# Patient Record
Sex: MALE | Race: BLACK OR AFRICAN AMERICAN | Employment: OTHER | URBAN - METROPOLITAN AREA
[De-identification: names, ages, dates, MRNs, and addresses within clinical notes are randomized per-mention and may not be internally consistent; named-entity substitution may affect disease eponyms.]

---

## 2020-10-21 ENCOUNTER — HOSPITAL ENCOUNTER (EMERGENCY)
Age: 60
Discharge: LEFT BEFORE DISCHARGE | End: 2020-10-21
Attending: EMERGENCY MEDICINE

## 2020-10-21 VITALS
TEMPERATURE: 97.5 F | RESPIRATION RATE: 17 BRPM | HEIGHT: 68 IN | WEIGHT: 165 LBS | DIASTOLIC BLOOD PRESSURE: 115 MMHG | SYSTOLIC BLOOD PRESSURE: 179 MMHG | OXYGEN SATURATION: 98 % | BODY MASS INDEX: 25.01 KG/M2 | HEART RATE: 83 BPM

## 2020-10-22 NOTE — ED PROVIDER NOTES
Patient eloped from the ED prior to my evaluation. Triage vitals show HTN, otherwise wnl.     MD Kait Chaves MD  10/21/20 0177

## 2020-10-28 ENCOUNTER — APPOINTMENT (OUTPATIENT)
Dept: GENERAL RADIOLOGY | Age: 60
End: 2020-10-28
Payer: MEDICAID

## 2020-10-28 ENCOUNTER — HOSPITAL ENCOUNTER (EMERGENCY)
Age: 60
Discharge: HOME | End: 2020-10-28
Attending: EMERGENCY MEDICINE
Payer: MEDICAID

## 2020-10-28 VITALS
HEIGHT: 69 IN | BODY MASS INDEX: 24.44 KG/M2 | TEMPERATURE: 98.5 F | DIASTOLIC BLOOD PRESSURE: 80 MMHG | SYSTOLIC BLOOD PRESSURE: 176 MMHG | RESPIRATION RATE: 20 BRPM | WEIGHT: 165 LBS | OXYGEN SATURATION: 100 % | HEART RATE: 94 BPM

## 2020-10-28 LAB
ALBUMIN SERPL-MCNC: 3.3 G/DL (ref 3.5–5.2)
ALP BLD-CCNC: 97 U/L (ref 40–129)
ALT SERPL-CCNC: 17 U/L (ref 0–40)
ANION GAP SERPL CALCULATED.3IONS-SCNC: 14 MMOL/L (ref 7–16)
AST SERPL-CCNC: 25 U/L (ref 0–39)
BACTERIA: ABNORMAL /HPF
BETA-HYDROXYBUTYRATE: 0.92 MMOL/L (ref 0.02–0.27)
BILIRUB SERPL-MCNC: 1.1 MG/DL (ref 0–1.2)
BILIRUBIN URINE: NEGATIVE
BLOOD, URINE: ABNORMAL
BUN BLDV-MCNC: 16 MG/DL (ref 6–20)
CALCIUM SERPL-MCNC: 9.3 MG/DL (ref 8.6–10.2)
CHLORIDE BLD-SCNC: 97 MMOL/L (ref 98–107)
CHP ED QC CHECK: NORMAL
CLARITY: ABNORMAL
CO2: 20 MMOL/L (ref 22–29)
COLOR: YELLOW
CREAT SERPL-MCNC: 0.9 MG/DL (ref 0.7–1.2)
GFR AFRICAN AMERICAN: >60
GFR NON-AFRICAN AMERICAN: >60 ML/MIN/1.73
GLUCOSE BLD-MCNC: 346 MG/DL (ref 74–99)
GLUCOSE BLD-MCNC: 388 MG/DL
GLUCOSE BLD-MCNC: 444 MG/DL (ref 74–99)
GLUCOSE BLD-MCNC: 528 MG/DL (ref 74–99)
GLUCOSE URINE: >=1000 MG/DL
HCT VFR BLD CALC: 42.2 % (ref 37–54)
HEMOGLOBIN: 15 G/DL (ref 12.5–16.5)
KETONES, URINE: 15 MG/DL
LEUKOCYTE ESTERASE, URINE: ABNORMAL
MCH RBC QN AUTO: 30.7 PG (ref 26–35)
MCHC RBC AUTO-ENTMCNC: 35.5 % (ref 32–34.5)
MCV RBC AUTO: 86.3 FL (ref 80–99.9)
METER GLUCOSE: 388 MG/DL (ref 74–99)
NITRITE, URINE: NEGATIVE
PDW BLD-RTO: 12.4 FL (ref 11.5–15)
PERFORMED ON: ABNORMAL
PERFORMED ON: ABNORMAL
PH UA: 5.5 (ref 5–9)
PH VENOUS: 7.35 (ref 7.35–7.45)
PLATELET # BLD: 192 E9/L (ref 130–450)
PMV BLD AUTO: 10.9 FL (ref 7–12)
POC CHLORIDE: 101 MMOL/L (ref 100–108)
POC CHLORIDE: 108 MMOL/L (ref 100–108)
POC CREATININE: 0.7 MG/DL (ref 0.7–1.2)
POC CREATININE: 0.8 MG/DL (ref 0.7–1.2)
POC POTASSIUM: 3.3 MMOL/L (ref 3.5–5)
POC POTASSIUM: 4.3 MMOL/L (ref 3.5–5)
POC SODIUM: 133 MMOL/L (ref 132–146)
POC SODIUM: 139 MMOL/L (ref 132–146)
POTASSIUM REFLEX MAGNESIUM: 3.7 MMOL/L (ref 3.5–5)
PROTEIN UA: 100 MG/DL
RBC # BLD: 4.89 E12/L (ref 3.8–5.8)
RBC UA: ABNORMAL /HPF (ref 0–2)
REASON FOR REJECTION: NORMAL
REJECTED TEST: NORMAL
SODIUM BLD-SCNC: 131 MMOL/L (ref 132–146)
SPECIFIC GRAVITY UA: 1.02 (ref 1–1.03)
TOTAL PROTEIN: 7.3 G/DL (ref 6.4–8.3)
TROPONIN: <0.01 NG/ML (ref 0–0.03)
UROBILINOGEN, URINE: 1 E.U./DL
WBC # BLD: 14.7 E9/L (ref 4.5–11.5)
WBC UA: ABNORMAL /HPF (ref 0–5)

## 2020-10-28 PROCEDURE — 82565 ASSAY OF CREATININE: CPT

## 2020-10-28 PROCEDURE — 82800 BLOOD PH: CPT

## 2020-10-28 PROCEDURE — 6360000002 HC RX W HCPCS: Performed by: EMERGENCY MEDICINE

## 2020-10-28 PROCEDURE — 71045 X-RAY EXAM CHEST 1 VIEW: CPT

## 2020-10-28 PROCEDURE — 6370000000 HC RX 637 (ALT 250 FOR IP): Performed by: EMERGENCY MEDICINE

## 2020-10-28 PROCEDURE — 82435 ASSAY OF BLOOD CHLORIDE: CPT

## 2020-10-28 PROCEDURE — 96365 THER/PROPH/DIAG IV INF INIT: CPT

## 2020-10-28 PROCEDURE — 82010 KETONE BODYS QUAN: CPT

## 2020-10-28 PROCEDURE — 96372 THER/PROPH/DIAG INJ SC/IM: CPT

## 2020-10-28 PROCEDURE — 81001 URINALYSIS AUTO W/SCOPE: CPT

## 2020-10-28 PROCEDURE — 93005 ELECTROCARDIOGRAM TRACING: CPT | Performed by: EMERGENCY MEDICINE

## 2020-10-28 PROCEDURE — 2580000003 HC RX 258: Performed by: EMERGENCY MEDICINE

## 2020-10-28 PROCEDURE — 82947 ASSAY GLUCOSE BLOOD QUANT: CPT

## 2020-10-28 PROCEDURE — 80053 COMPREHEN METABOLIC PANEL: CPT

## 2020-10-28 PROCEDURE — 84132 ASSAY OF SERUM POTASSIUM: CPT

## 2020-10-28 PROCEDURE — 84295 ASSAY OF SERUM SODIUM: CPT

## 2020-10-28 PROCEDURE — 82962 GLUCOSE BLOOD TEST: CPT

## 2020-10-28 PROCEDURE — 85027 COMPLETE CBC AUTOMATED: CPT

## 2020-10-28 PROCEDURE — 99284 EMERGENCY DEPT VISIT MOD MDM: CPT

## 2020-10-28 PROCEDURE — 84484 ASSAY OF TROPONIN QUANT: CPT

## 2020-10-28 RX ORDER — 0.9 % SODIUM CHLORIDE 0.9 %
1000 INTRAVENOUS SOLUTION INTRAVENOUS ONCE
Status: COMPLETED | OUTPATIENT
Start: 2020-10-28 | End: 2020-10-28

## 2020-10-28 RX ORDER — LISINOPRIL 10 MG/1
10 TABLET ORAL DAILY
Qty: 30 TABLET | Refills: 0 | Status: SHIPPED | OUTPATIENT
Start: 2020-10-28 | End: 2020-11-27

## 2020-10-28 RX ORDER — CEFDINIR 300 MG/1
300 CAPSULE ORAL 2 TIMES DAILY
Qty: 10 CAPSULE | Refills: 0 | Status: SHIPPED | OUTPATIENT
Start: 2020-10-28 | End: 2020-11-02

## 2020-10-28 RX ORDER — INSULIN HUMAN 100 [IU]/ML
20 INJECTION, SUSPENSION SUBCUTANEOUS 2 TIMES DAILY
Qty: 5 PEN | Refills: 3 | Status: SHIPPED | OUTPATIENT
Start: 2020-10-28

## 2020-10-28 RX ORDER — AMLODIPINE BESYLATE 10 MG/1
10 TABLET ORAL DAILY
Qty: 30 TABLET | Refills: 0 | Status: SHIPPED | OUTPATIENT
Start: 2020-10-28 | End: 2020-11-27

## 2020-10-28 RX ADMIN — INSULIN LISPRO 20 UNITS: 100 INJECTION, SUSPENSION SUBCUTANEOUS at 20:28

## 2020-10-28 RX ADMIN — CEFTRIAXONE SODIUM 1 G: 1 INJECTION, POWDER, FOR SOLUTION INTRAMUSCULAR; INTRAVENOUS at 19:56

## 2020-10-28 RX ADMIN — SODIUM CHLORIDE 1000 ML: 9 INJECTION, SOLUTION INTRAVENOUS at 15:24

## 2020-10-28 ASSESSMENT — ENCOUNTER SYMPTOMS
CHEST TIGHTNESS: 0
ABDOMINAL PAIN: 0
NAUSEA: 0
VOMITING: 0
SHORTNESS OF BREATH: 0

## 2020-10-28 ASSESSMENT — PAIN DESCRIPTION - LOCATION: LOCATION: GROIN

## 2020-10-28 ASSESSMENT — PAIN DESCRIPTION - ORIENTATION: ORIENTATION: RIGHT

## 2020-10-28 ASSESSMENT — PAIN DESCRIPTION - PAIN TYPE: TYPE: ACUTE PAIN

## 2020-10-28 NOTE — ED NOTES
Bed: 18A-18  Expected date:   Expected time:   Means of arrival:   Comments:  [id number] Venancio Cao RN  10/28/20 [id number]

## 2020-10-28 NOTE — ED PROVIDER NOTES
41-year-old male presenting with concern about hyperglycemia. Says he is not had his insulin or his blood pressure medicine or his Flomax for about 2 weeks. [place] here for another week or so. No history of DKA that he is aware of. He does provide the history that he is on insulin, he takes lisinopril, amlodipine, Flomax, and finasteride as well. Awake, alert, oriented x4, no complaint of chest pain or shortness of breath or lightheadedness or dizziness. No nausea or vomiting. Timing: Gradual  Quality: Worsening  Severity: Moderate  Duration: 2 weeks  Associated Signs/symptoms: Not taking his insulin  History reviewed. No pertinent family history. History reviewed. No pertinent surgical history. Review of Systems   Constitutional: Negative for chills and fever. Respiratory: Negative for chest tightness and shortness of breath. Cardiovascular: Negative for chest pain and palpitations. Gastrointestinal: Negative for abdominal pain, nausea and vomiting. Endocrine:        Hyperglycemia   All other systems reviewed and are negative. Physical Exam  Constitutional:       General: He is not in acute distress. Appearance: He is well-developed. HENT:      Head: Normocephalic and atraumatic. Eyes:      Pupils: Pupils are equal, round, and reactive to light. Neck:      Musculoskeletal: Normal range of motion and neck supple. Thyroid: No thyromegaly. Cardiovascular:      Rate and Rhythm: Normal rate and regular rhythm. Pulmonary:      Effort: Pulmonary effort is normal. No respiratory distress. Breath sounds: Normal breath sounds. No wheezing. Abdominal:      General: There is no distension. Palpations: Abdomen is soft. There is no mass. Tenderness: There is no abdominal tenderness. There is no guarding or rebound. Musculoskeletal: Normal range of motion. General: No tenderness.    Skin:     General: Skin is warm and dry. Findings: No erythema. Neurological:      Mental Status: He is alert and oriented to person, place, and time. Cranial Nerves: No cranial nerve deficit. Procedures     MDM     ED Course as of Oct 28 2005   Wed Oct 28, 2020   1928 Patient stanton awake alert and oriented. He will be discharged home with a antibiotic prescription for his urine. He will also be given a prescription for his insulin and his blood pressure medication.    [SO]      ED Course User Index  [SO] Mia Cheema DO        EKG: Sinus rhythm, rate of 85, normal axis, no ST elevations depressions, no T wave abnormalities, nonspecific ST changes. .    ED Course as of Oct 28 2005   Wed Oct 28, 2020   1928 Patient stanton awake alert and oriented. He will be discharged home with a antibiotic prescription for his urine. He will also be given a prescription for his insulin and his blood pressure medication.    [SO]      ED Course User Index  [SO] Mia Cheema DO       --------------------------------------------- PAST HISTORY ---------------------------------------------  Past Medical History:  has a past medical history of Diabetes mellitus ([facility] [address] Past Surgical History:  has no past surgical history on file. Social History:  reports that he has been smoking. He has never used smokeless tobacco.    Family History: family history is not on file. The patients home medications have been reviewed. Allergies: Patient has no known allergies.     -------------------------------------------------- RESULTS -------------------------------------------------  Labs:  Results for orders placed or performed during the hospital encounter of 10/28/20   CBC   Result Value Ref Range    WBC 14.7 (H) 4.5 - 11.5 E9/L    RBC 4.89 3.80 - 5.80 E12/L    Hemoglobin 15.0 12.5 - 16.5 g/dL    Hematocrit 42.2 37.0 - 54.0 %    MCV 86.3 80.0 - 99.9 fL    MCH 30.7 26.0 - 35.0 pg    MCHC 35.5 (H) 32.0 - 34.5 %    RDW 12.4 11.5 - 15.0 fL Platelets 931 410 - 440 E9/L    MPV 10.9 7.0 - 12.0 fL   pH, venous   Result Value Ref Range    pH, Jey 7.35 7.35 - 7.45   Beta-Hydroxybutyrate   Result Value Ref Range    Beta-Hydroxybutyrate 0.92 (H) 0.02 - 0.27 mmol/L   SPECIMEN REJECTION   Result Value Ref Range    Rejected Test cmp trop     Reason for Rejection see below    Comprehensive Metabolic Panel w/ Reflex to MG   Result Value Ref Range    Sodium 131 (L) 132 - 146 mmol/L    Potassium reflex Magnesium 3.7 3.5 - 5.0 mmol/L    Chloride 97 (L) 98 - 107 mmol/L    CO2 20 (L) 22 - 29 mmol/L    Anion Gap 14 7 - 16 mmol/L    Glucose 444 (H) 74 - 99 mg/dL    BUN 16 6 - 20 mg/dL    CREATININE 0.9 0.7 - 1.2 mg/dL    GFR Non-African American >60 >=60 mL/min/1.73    GFR African American >60     Calcium 9.3 8.6 - 10.2 mg/dL    Total Protein 7.3 6.4 - 8.3 g/dL    Alb 3.3 (L) 3.5 - 5.2 g/dL    Total Bilirubin 1.1 0.0 - 1.2 mg/dL    Alkaline Phosphatase 97 40 - 129 U/L    ALT 17 0 - 40 U/L    AST 25 0 - 39 U/L   Troponin   Result Value Ref Range    Troponin <0.01 0.00 - 0.03 ng/mL   Urinalysis with Microscopic   Result Value Ref Range    Color, UA Yellow Straw/Yellow    Clarity, UA SL CLOUDY Clear    Glucose, Ur >=1000 (A) Negative mg/dL    Bilirubin Urine Negative Negative    Ketones, Urine 15 (A) Negative mg/dL    Specific Gravity, UA 1.020 1.005 - 1.030    Blood, Urine SMALL (A) Negative    pH, UA 5.5 5.0 - 9.0    Protein,  (A) Negative mg/dL    Urobilinogen, Urine 1.0 <2.0 E.U./dL    Nitrite, Urine Negative Negative    Leukocyte Esterase, Urine TRACE (A) Negative    WBC, UA 10-20 (A) 0 - 5 /HPF    RBC, UA 5-10 (A) 0 - 2 /HPF    Bacteria, UA MANY (A) None Seen /HPF   POCT Venous   Result Value Ref Range    POC Sodium 133 132 - 146 mmol/L    POC Potassium 4.3 3.5 - 5.0 mmol/L    POC Chloride 101 100 - 108 mmol/L    POC Glucose 528 (HH) 74 - 99 mg/dl    POC Creatinine 0.8 0.7 - 1.2 mg/dL    GFR Non-African American >60 >=60 mL/min/1.73    GFR African American >60 Performed on SEE BELOW    POCT glucose   Result Value Ref Range    Glucose 388 mg/dL    QC OK? ok    POCT Glucose   Result Value Ref Range    Meter Glucose 388 (H) 74 - 99 mg/dL   POCT Venous   Result Value Ref Range    POC Sodium 139 132 - 146 mmol/L    POC Potassium 3.3 (L) 3.5 - 5.0 mmol/L    POC Chloride 108 100 - 108 mmol/L    POC Glucose 346 (H) 74 - 99 mg/dl    POC Creatinine 0.7 0.7 - 1.2 mg/dL    GFR Non-African American >60 >=60 mL/min/1.73    GFR  >60     Performed on SEE BELOW        Radiology:  XR CHEST PORTABLE   Final Result   No acute cardiopulmonary abnormality.             ------------------------- NURSING NOTES AND VITALS REVIEWED ---------------------------  Date / Time Roomed:  10/28/2020  2:44 PM  ED Bed Assignment:  18A/18A-18    The nursing notes within the ED encounter and vital signs as below have been reviewed. BP (!) 172/81   Pulse 90   Temp 98.6 °F (37 °C) (Infrared)   Resp 20   Ht 5' 8.5\" (1.74 m)   Wt 165 lb (74.8 kg)   SpO2 100%   BMI 24.72 kg/m²   Oxygen Saturation Interpretation: Normal      ------------------------------------------ PROGRESS NOTES ------------------------------------------  I have spoken with the patient and discussed todays results, in addition to providing specific details for the plan of care and counseling regarding the diagnosis and prognosis. Their questions are answered at this time and they are agreeable with the plan. I discussed at length with them reasons for immediate return here for re evaluation. They will followup with primary care by calling their office tomorrow. --------------------------------- ADDITIONAL PROVIDER NOTES ---------------------------------  At this time the patient is without objective evidence of an acute process requiring hospitalization or inpatient management. They have remained hemodynamically stable throughout their entire ED visit and are stable for discharge with outpatient follow-up. The plan has been discussed in detail and they are aware of the specific conditions for emergent return, as well as the importance of follow-up. New Prescriptions    AMLODIPINE (NORVASC) 10 MG TABLET    Take 1 tablet by mouth daily    CEFDINIR (OMNICEF) 300 MG CAPSULE    Take 1 capsule by mouth 2 times daily for 5 days    INSULIN NPH ISOPHANE & REGULAR (HUMULIN 70/30 KWIKPEN) (70-30) 100 UNIT PER ML INJECTION PEN    Inject 20 Units into the skin 2 times daily    LISINOPRIL (PRINIVIL;ZESTRIL) 10 MG TABLET    Take 1 tablet by mouth daily       Diagnosis:  1. Hyperglycemia    2. Acute cystitis without hematuria        Disposition:  Patient's disposition: Discharge to home  Patient's condition is stable.            Jose Bowen DO  10/28/20 2005

## 2020-10-31 LAB
EKG ATRIAL RATE: 85 BPM
EKG P AXIS: 81 DEGREES
EKG P-R INTERVAL: 122 MS
EKG Q-T INTERVAL: 398 MS
EKG QRS DURATION: 80 MS
EKG QTC CALCULATION (BAZETT): 473 MS
EKG R AXIS: 69 DEGREES
EKG T AXIS: 51 DEGREES
EKG VENTRICULAR RATE: 85 BPM

## 2020-10-31 PROCEDURE — 93010 ELECTROCARDIOGRAM REPORT: CPT | Performed by: INTERNAL MEDICINE

## 2020-12-21 ENCOUNTER — APPOINTMENT (OUTPATIENT)
Dept: ULTRASOUND IMAGING | Age: 60
DRG: 501 | End: 2020-12-21
Payer: MEDICAID

## 2020-12-21 ENCOUNTER — HOSPITAL ENCOUNTER (INPATIENT)
Age: 60
LOS: 1 days | Discharge: HOME | DRG: 501 | End: 2020-12-23
Attending: EMERGENCY MEDICINE | Admitting: FAMILY MEDICINE
Payer: MEDICAID

## 2020-12-21 PROBLEM — N45.4 EPIDIDYMAL ABSCESS: Status: ACTIVE | Noted: 2020-12-21

## 2020-12-21 LAB
ALBUMIN SERPL-MCNC: 3.5 G/DL (ref 3.5–5.2)
ALP BLD-CCNC: 71 U/L (ref 40–129)
ALT SERPL-CCNC: 13 U/L (ref 0–40)
ANION GAP SERPL CALCULATED.3IONS-SCNC: 12 MMOL/L (ref 7–16)
AST SERPL-CCNC: 26 U/L (ref 0–39)
BACTERIA: ABNORMAL /HPF
BASOPHILS ABSOLUTE: 0.02 E9/L (ref 0–0.2)
BASOPHILS RELATIVE PERCENT: 0.2 % (ref 0–2)
BILIRUB SERPL-MCNC: 0.8 MG/DL (ref 0–1.2)
BILIRUBIN URINE: NEGATIVE
BLOOD, URINE: ABNORMAL
BUN BLDV-MCNC: 11 MG/DL (ref 6–20)
CALCIUM SERPL-MCNC: 9.4 MG/DL (ref 8.6–10.2)
CHLORIDE BLD-SCNC: 103 MMOL/L (ref 98–107)
CLARITY: ABNORMAL
CO2: 23 MMOL/L (ref 22–29)
COLOR: YELLOW
CREAT SERPL-MCNC: 0.9 MG/DL (ref 0.7–1.2)
EOSINOPHILS ABSOLUTE: 0.01 E9/L (ref 0.05–0.5)
EOSINOPHILS RELATIVE PERCENT: 0.1 % (ref 0–6)
EPITHELIAL CELLS, UA: ABNORMAL /HPF
GFR AFRICAN AMERICAN: >60
GFR NON-AFRICAN AMERICAN: >60 ML/MIN/1.73
GLUCOSE BLD-MCNC: 130 MG/DL (ref 74–99)
GLUCOSE URINE: NEGATIVE MG/DL
HCT VFR BLD CALC: 40.1 % (ref 37–54)
HEMOGLOBIN: 13.7 G/DL (ref 12.5–16.5)
IMMATURE GRANULOCYTES #: 0.03 E9/L
IMMATURE GRANULOCYTES %: 0.3 % (ref 0–5)
KETONES, URINE: ABNORMAL MG/DL
LEUKOCYTE ESTERASE, URINE: ABNORMAL
LYMPHOCYTES ABSOLUTE: 2.06 E9/L (ref 1.5–4)
LYMPHOCYTES RELATIVE PERCENT: 22.3 % (ref 20–42)
MCH RBC QN AUTO: 30.6 PG (ref 26–35)
MCHC RBC AUTO-ENTMCNC: 34.2 % (ref 32–34.5)
MCV RBC AUTO: 89.7 FL (ref 80–99.9)
METER GLUCOSE: 215 MG/DL (ref 74–99)
METER GLUCOSE: 218 MG/DL (ref 74–99)
MONOCYTES ABSOLUTE: 0.68 E9/L (ref 0.1–0.95)
MONOCYTES RELATIVE PERCENT: 7.4 % (ref 2–12)
NEUTROPHILS ABSOLUTE: 6.45 E9/L (ref 1.8–7.3)
NEUTROPHILS RELATIVE PERCENT: 69.7 % (ref 43–80)
NITRITE, URINE: POSITIVE
PDW BLD-RTO: 13.3 FL (ref 11.5–15)
PH UA: 6 (ref 5–9)
PLATELET # BLD: 265 E9/L (ref 130–450)
PMV BLD AUTO: 10.2 FL (ref 7–12)
POTASSIUM REFLEX MAGNESIUM: 4.3 MMOL/L (ref 3.5–5)
PROTEIN UA: 100 MG/DL
RBC # BLD: 4.47 E12/L (ref 3.8–5.8)
RBC UA: ABNORMAL /HPF (ref 0–2)
SODIUM BLD-SCNC: 138 MMOL/L (ref 132–146)
SPECIFIC GRAVITY UA: >=1.03 (ref 1–1.03)
TOTAL PROTEIN: 7.5 G/DL (ref 6.4–8.3)
UROBILINOGEN, URINE: 2 E.U./DL
WBC # BLD: 9.3 E9/L (ref 4.5–11.5)
WBC UA: ABNORMAL /HPF (ref 0–5)

## 2020-12-21 PROCEDURE — 6370000000 HC RX 637 (ALT 250 FOR IP): Performed by: FAMILY MEDICINE

## 2020-12-21 PROCEDURE — 87040 BLOOD CULTURE FOR BACTERIA: CPT

## 2020-12-21 PROCEDURE — 6360000002 HC RX W HCPCS: Performed by: STUDENT IN AN ORGANIZED HEALTH CARE EDUCATION/TRAINING PROGRAM

## 2020-12-21 PROCEDURE — 96376 TX/PRO/DX INJ SAME DRUG ADON: CPT

## 2020-12-21 PROCEDURE — G0378 HOSPITAL OBSERVATION PER HR: HCPCS

## 2020-12-21 PROCEDURE — 85025 COMPLETE CBC W/AUTO DIFF WBC: CPT

## 2020-12-21 PROCEDURE — 87088 URINE BACTERIA CULTURE: CPT

## 2020-12-21 PROCEDURE — 99283 EMERGENCY DEPT VISIT LOW MDM: CPT

## 2020-12-21 PROCEDURE — 81001 URINALYSIS AUTO W/SCOPE: CPT

## 2020-12-21 PROCEDURE — 76870 US EXAM SCROTUM: CPT

## 2020-12-21 PROCEDURE — 96375 TX/PRO/DX INJ NEW DRUG ADDON: CPT

## 2020-12-21 PROCEDURE — 2580000003 HC RX 258: Performed by: FAMILY MEDICINE

## 2020-12-21 PROCEDURE — 2580000003 HC RX 258: Performed by: STUDENT IN AN ORGANIZED HEALTH CARE EDUCATION/TRAINING PROGRAM

## 2020-12-21 PROCEDURE — 87591 N.GONORRHOEAE DNA AMP PROB: CPT

## 2020-12-21 PROCEDURE — 93975 VASCULAR STUDY: CPT

## 2020-12-21 PROCEDURE — 82962 GLUCOSE BLOOD TEST: CPT

## 2020-12-21 PROCEDURE — 96374 THER/PROPH/DIAG INJ IV PUSH: CPT

## 2020-12-21 PROCEDURE — 87186 SC STD MICRODIL/AGAR DIL: CPT

## 2020-12-21 PROCEDURE — 80053 COMPREHEN METABOLIC PANEL: CPT

## 2020-12-21 PROCEDURE — 96365 THER/PROPH/DIAG IV INF INIT: CPT

## 2020-12-21 PROCEDURE — 96372 THER/PROPH/DIAG INJ SC/IM: CPT

## 2020-12-21 PROCEDURE — 6360000002 HC RX W HCPCS: Performed by: FAMILY MEDICINE

## 2020-12-21 PROCEDURE — 87491 CHLMYD TRACH DNA AMP PROBE: CPT

## 2020-12-21 RX ORDER — DEXTROSE MONOHYDRATE 50 MG/ML
100 INJECTION, SOLUTION INTRAVENOUS PRN
Status: DISCONTINUED | OUTPATIENT
Start: 2020-12-21 | End: 2020-12-23 | Stop reason: HOSPADM

## 2020-12-21 RX ORDER — KETOROLAC TROMETHAMINE 30 MG/ML
15 INJECTION, SOLUTION INTRAMUSCULAR; INTRAVENOUS EVERY 6 HOURS PRN
Status: DISCONTINUED | OUTPATIENT
Start: 2020-12-21 | End: 2020-12-23 | Stop reason: HOSPADM

## 2020-12-21 RX ORDER — MAGNESIUM SULFATE IN WATER 40 MG/ML
2 INJECTION, SOLUTION INTRAVENOUS PRN
Status: DISCONTINUED | OUTPATIENT
Start: 2020-12-21 | End: 2020-12-23 | Stop reason: HOSPADM

## 2020-12-21 RX ORDER — PROMETHAZINE HYDROCHLORIDE 25 MG/1
12.5 TABLET ORAL EVERY 6 HOURS PRN
Status: DISCONTINUED | OUTPATIENT
Start: 2020-12-21 | End: 2020-12-23 | Stop reason: HOSPADM

## 2020-12-21 RX ORDER — SODIUM CHLORIDE 0.9 % (FLUSH) 0.9 %
10 SYRINGE (ML) INJECTION PRN
Status: DISCONTINUED | OUTPATIENT
Start: 2020-12-21 | End: 2020-12-23 | Stop reason: HOSPADM

## 2020-12-21 RX ORDER — POLYETHYLENE GLYCOL 3350 17 G/17G
17 POWDER, FOR SOLUTION ORAL DAILY PRN
Status: DISCONTINUED | OUTPATIENT
Start: 2020-12-21 | End: 2020-12-23 | Stop reason: HOSPADM

## 2020-12-21 RX ORDER — TAMSULOSIN HYDROCHLORIDE 0.4 MG/1
0.4 CAPSULE ORAL DAILY
Status: DISCONTINUED | OUTPATIENT
Start: 2020-12-22 | End: 2020-12-23 | Stop reason: HOSPADM

## 2020-12-21 RX ORDER — NICOTINE POLACRILEX 4 MG
15 LOZENGE BUCCAL PRN
Status: DISCONTINUED | OUTPATIENT
Start: 2020-12-21 | End: 2020-12-23 | Stop reason: HOSPADM

## 2020-12-21 RX ORDER — ACETAMINOPHEN 325 MG/1
650 TABLET ORAL EVERY 6 HOURS PRN
Status: DISCONTINUED | OUTPATIENT
Start: 2020-12-21 | End: 2020-12-23 | Stop reason: HOSPADM

## 2020-12-21 RX ORDER — ONDANSETRON 2 MG/ML
4 INJECTION INTRAMUSCULAR; INTRAVENOUS EVERY 6 HOURS PRN
Status: DISCONTINUED | OUTPATIENT
Start: 2020-12-21 | End: 2020-12-23 | Stop reason: HOSPADM

## 2020-12-21 RX ORDER — HYDROCODONE BITARTRATE AND ACETAMINOPHEN 5; 325 MG/1; MG/1
1 TABLET ORAL EVERY 4 HOURS PRN
Status: DISCONTINUED | OUTPATIENT
Start: 2020-12-21 | End: 2020-12-23 | Stop reason: HOSPADM

## 2020-12-21 RX ORDER — INSULIN GLARGINE 100 [IU]/ML
0.25 INJECTION, SOLUTION SUBCUTANEOUS NIGHTLY
Status: DISCONTINUED | OUTPATIENT
Start: 2020-12-21 | End: 2020-12-23 | Stop reason: HOSPADM

## 2020-12-21 RX ORDER — DEXTROSE MONOHYDRATE 25 G/50ML
12.5 INJECTION, SOLUTION INTRAVENOUS PRN
Status: DISCONTINUED | OUTPATIENT
Start: 2020-12-21 | End: 2020-12-23 | Stop reason: HOSPADM

## 2020-12-21 RX ORDER — LISINOPRIL 10 MG/1
10 TABLET ORAL DAILY
Status: DISCONTINUED | OUTPATIENT
Start: 2020-12-21 | End: 2020-12-23 | Stop reason: HOSPADM

## 2020-12-21 RX ORDER — SODIUM CHLORIDE 0.9 % (FLUSH) 0.9 %
10 SYRINGE (ML) INJECTION EVERY 12 HOURS SCHEDULED
Status: DISCONTINUED | OUTPATIENT
Start: 2020-12-21 | End: 2020-12-23 | Stop reason: HOSPADM

## 2020-12-21 RX ORDER — KETOROLAC TROMETHAMINE 30 MG/ML
30 INJECTION, SOLUTION INTRAMUSCULAR; INTRAVENOUS ONCE
Status: COMPLETED | OUTPATIENT
Start: 2020-12-21 | End: 2020-12-21

## 2020-12-21 RX ORDER — ACETAMINOPHEN 650 MG/1
650 SUPPOSITORY RECTAL EVERY 6 HOURS PRN
Status: DISCONTINUED | OUTPATIENT
Start: 2020-12-21 | End: 2020-12-23 | Stop reason: HOSPADM

## 2020-12-21 RX ORDER — AMLODIPINE BESYLATE 10 MG/1
10 TABLET ORAL DAILY
Status: DISCONTINUED | OUTPATIENT
Start: 2020-12-21 | End: 2020-12-23 | Stop reason: HOSPADM

## 2020-12-21 RX ORDER — POTASSIUM CHLORIDE 7.45 MG/ML
10 INJECTION INTRAVENOUS PRN
Status: DISCONTINUED | OUTPATIENT
Start: 2020-12-21 | End: 2020-12-23 | Stop reason: HOSPADM

## 2020-12-21 RX ORDER — POTASSIUM CHLORIDE 20 MEQ/1
40 TABLET, EXTENDED RELEASE ORAL PRN
Status: DISCONTINUED | OUTPATIENT
Start: 2020-12-21 | End: 2020-12-23 | Stop reason: HOSPADM

## 2020-12-21 RX ADMIN — HYDROCODONE BITARTRATE AND ACETAMINOPHEN 1 TABLET: 5; 325 TABLET ORAL at 23:58

## 2020-12-21 RX ADMIN — AMLODIPINE BESYLATE 10 MG: 10 TABLET ORAL at 17:16

## 2020-12-21 RX ADMIN — INSULIN LISPRO 2 UNITS: 100 INJECTION, SOLUTION INTRAVENOUS; SUBCUTANEOUS at 20:48

## 2020-12-21 RX ADMIN — INSULIN GLARGINE 19 UNITS: 100 INJECTION, SOLUTION SUBCUTANEOUS at 20:49

## 2020-12-21 RX ADMIN — ENOXAPARIN SODIUM 40 MG: 40 INJECTION SUBCUTANEOUS at 16:40

## 2020-12-21 RX ADMIN — INSULIN LISPRO 4 UNITS: 100 INJECTION, SOLUTION INTRAVENOUS; SUBCUTANEOUS at 16:40

## 2020-12-21 RX ADMIN — KETOROLAC TROMETHAMINE 15 MG: 30 INJECTION, SOLUTION INTRAMUSCULAR at 18:29

## 2020-12-21 RX ADMIN — SODIUM CHLORIDE, PRESERVATIVE FREE 10 ML: 5 INJECTION INTRAVENOUS at 20:48

## 2020-12-21 RX ADMIN — KETOROLAC TROMETHAMINE 30 MG: 30 INJECTION, SOLUTION INTRAMUSCULAR at 12:06

## 2020-12-21 RX ADMIN — CEFTRIAXONE SODIUM 1 G: 1 INJECTION, POWDER, FOR SOLUTION INTRAMUSCULAR; INTRAVENOUS at 14:03

## 2020-12-21 RX ADMIN — LISINOPRIL 10 MG: 10 TABLET ORAL at 17:16

## 2020-12-21 ASSESSMENT — PAIN DESCRIPTION - PROGRESSION: CLINICAL_PROGRESSION: NOT CHANGED

## 2020-12-21 ASSESSMENT — PAIN DESCRIPTION - FREQUENCY: FREQUENCY: CONTINUOUS

## 2020-12-21 ASSESSMENT — ENCOUNTER SYMPTOMS
WHEEZING: 0
NAUSEA: 0
BACK PAIN: 0
VOMITING: 0
SORE THROAT: 0
ABDOMINAL PAIN: 0
EYE DISCHARGE: 0
DIARRHEA: 0
SINUS PRESSURE: 0
EYE PAIN: 0
SHORTNESS OF BREATH: 0
COUGH: 0
EYE REDNESS: 0

## 2020-12-21 ASSESSMENT — PAIN DESCRIPTION - ORIENTATION
ORIENTATION: RIGHT;LEFT
ORIENTATION: RIGHT;LEFT

## 2020-12-21 ASSESSMENT — PAIN DESCRIPTION - LOCATION
LOCATION: SCROTUM
LOCATION: SCROTUM

## 2020-12-21 ASSESSMENT — PAIN - FUNCTIONAL ASSESSMENT: PAIN_FUNCTIONAL_ASSESSMENT: PREVENTS OR INTERFERES SOME ACTIVE ACTIVITIES AND ADLS

## 2020-12-21 ASSESSMENT — PAIN DESCRIPTION - PAIN TYPE
TYPE: ACUTE PAIN
TYPE: ACUTE PAIN

## 2020-12-21 ASSESSMENT — PAIN SCALES - GENERAL
PAINLEVEL_OUTOF10: 6
PAINLEVEL_OUTOF10: 8
PAINLEVEL_OUTOF10: 10
PAINLEVEL_OUTOF10: 10
PAINLEVEL_OUTOF10: 6

## 2020-12-21 ASSESSMENT — PAIN DESCRIPTION - DESCRIPTORS
DESCRIPTORS: CONSTANT;ACHING;DISCOMFORT
DESCRIPTORS: CONSTANT

## 2020-12-21 ASSESSMENT — PAIN DESCRIPTION - ONSET: ONSET: ON-GOING

## 2020-12-21 NOTE — H&P
Hospitalist History & Physical      PCP: No primary care provider on file. Date of Admission: 12/21/2020    Date of Service: Pt seen/examined on 12/21/2020    Chief Complaint:  had concerns including Groin Swelling (Started prior to Friday, went to [place] today and sent in for testiclar swelling and pain. States on Friday he went and bough a support strap with no relief. Swelling is only on the left with pain generalized. ). History Of Present Illness:    Mr. Kareem Galvez, a 62y.o. year old male  who  has a past medical history of Diabetes mellitus ([facility] [address] Patient presented to the emergency department with complaint of left-sided testicular swelling. This began about 1 week ago. Pain is persistent and relieved with elevation. Denies fever, chills, nausea, vomiting, chest pain, shortness of breath, abdominal pain, diarrhea. Vital signs assessed emergency department within normal limits and stable. Laboratory studies unremarkable. Urinalysis shows positive for infection. Ultrasound of the scrotum reveals possible epididymal abscess. Urology was consulted. An empiric antibiotics were initiated      Past Medical History:        Diagnosis Date    Diabetes mellitus ([facility] [address]        Past Surgical History:    History reviewed. No pertinent surgical history. Medications Prior to Admission:      Prior to Admission medications    Medication Sig Start Date End Date Taking? Authorizing Provider   Insulin NPH Isophane & Regular (HUMULIN 70/30 KWIKPEN) (70-30) 100 UNIT per ML injection pen Inject 20 Units into the skin 2 times daily 10/28/20   Andrew Arriaga, DO   amLODIPine (NORVASC) 10 MG tablet Take 1 tablet by mouth daily 10/28/20 11/27/20  Andrew Arriaga, DO   lisinopril (PRINIVIL;ZESTRIL) 10 MG tablet Take 1 tablet by mouth daily 10/28/20 11/27/20  Andrew Arriaga, DO       Allergies:  Patient has no known allergies. Social History:    TOBACCO:   reports that he has been smoking.  He has never used smokeless tobacco.  ETOH:   has no history on file for alcohol. Family History:    Reviewed in detail and negative for DM, CAD, Cancer, CVA. Positive as follows\"  History reviewed. No pertinent family history. REVIEW OF SYSTEMS:   Pertinent positives as noted in the HPI. All other systems reviewed and negative. PHYSICAL EXAM:  BP (!) 168/82   Pulse 71   Temp 98.1 °F (36.7 °C) (Oral)   Resp 16   Ht 5' 8\" (1.727 m)   Wt 165 lb (74.8 kg)   SpO2 98%   BMI 25.09 kg/m²   General appearance: No apparent distress, appears stated age and cooperative. HEENT: Normal cephalic, atraumatic without obvious deformity. Pupils equal, round, and reactive to light. Extra ocular muscles intact. Conjunctivae/corneas clear. Neck: Supple, with full range of motion. No jugular venous distention. Trachea midline. Respiratory: Clear to auscultation bilaterally  Cardiovascular: Regular rate and rhythm  Abdomen: Soft, nontender, nondistended  Musculoskeletal: No clubbing, cyanosis, edema of bilateral lower extremities. Brisk capillary refill. Skin: Normal skin color. No rashes or lesions. Neurologic:  Neurovascularly intact without any focal sensory/motor deficits. Cranial nerves: II-XII intact, grossly non-focal.      CBC:   Recent Labs     12/21/20  1137   WBC 9.3   RBC 4.47   HGB 13.7   HCT 40.1   MCV 89.7   RDW 13.3        BMP:   Recent Labs     12/21/20  1137      K 4.3      CO2 23   BUN 11   CREATININE 0.9     LFT:  Recent Labs     12/21/20  1137   PROT 7.5   ALKPHOS 71   ALT 13   AST 26   BILITOT 0.8     CE:  No results for input(s): Carlos Alberto Beery in the last 72 hours. PT/INR: No results for input(s): INR, APTT in the last 72 hours. BNP: No results for input(s): BNP in the last 72 hours.   ESR: No results found for: SEDRATE  CRP: No results found for: CRP  D Dimer: No results found for: DDIMER   Folate and B12: No results found for: BCMGBDVI30, No results found for: FOLATE  Lactic Acid: No shows diffuse increased echogenicity with increased thickening of the central septations. Overall shape is slightly abnormal. There is blood flow seen only on 1 posteroinferior margin in the rest of the left testicle does not show any discernible blood flow, this could be a portion that is several days old. In addition there is diffuse swelling of the left scrotum and swelling of the left epididymis and surrounding tissues with several loculated abnormal fluid collections around the margins outside of the testicle. Considerations include hematomas versus abscesses. Doppler Evaluation:  Left testicle is abnormal with severely diminished abnormal blood flow, possible chronic torsion. Scrotal Sac:  No simple hydrocele. Diffusely thickened scrotal walls. Severe posterior scrotal swelling. Scattered loculated abnormal fluid pockets. The largest of these is septated, measures approximately 2 cm x 1.2 cm x 1.2 cm with echogenic fluid in the center. Increased blood flow around the outside margins of this collection. Abscess versus hematoma most likely within the epididymis. Epididymis:  Very abnormal left epididymis. No normal left epididymal structures seen. Reference scrotal contents above. Very abnormal study with severe swelling on the left have mild swelling on the right. Abnormal left testicle, which could be a torsion of several days old. Very abnormal left epididymis, thickened, inflamed, with scattered complex fluid collections. Abscesses versus hematomas. Right testicle slightly hyperemic but without evidence of torsion. Right epididymis is slightly thickened and moderately hyperemic. Report called to [facility] emergency room and discussed directly with Dr. Kirsty Billingsley at 1340 hours 12/21/2020.     Us Dup Abd Pel Retro Scrot Complete    Result Date: 12/21/2020  EXAMINATION: ULTRASOUND OF THE SCROTUM/TESTICLES WITH COLOR DOPPLER FLOW EVALUATION; DOPPLER EVALUATION OF THE PELVIS 12/21/2020 COMPARISON: None. HISTORY: ORDERING SYSTEM PROVIDED HISTORY: swelling TECHNOLOGIST PROVIDED HISTORY: Reason for exam:->swelling What reading provider will be dictating this exam?->CRC FINDINGS: Measurements: Right testicle: The right testicle measures approximately 4.6 cm long by 2.0 cm AP by  2.9 cm transverse. Left testicle: The left testicle measures approximately 3.8 cm long by 2.0 cm AP by 3.0 cm transverse. Right: Grey scale: The right testicle demonstrates normal homogeneous echotexture without focal lesion. No evidence of testicular microlithiasis. Doppler Evaluation:  There is normal arterial and venous Doppler flow within the testicle. Overall increased blood flow on the right side. Scrotal Sac:  No evidence of hydrocele. Epididymis: The right epididymis is diffusely mildly thickened in the head, body and tail with diffuse increased blood flow. Dilated veins are also seen in the spermatic cord. The epididymal body measures approximately 9 mm thick, normal is maximum 3 mm. The right epididymal head was measured at 1.0 cm by 1.4 cm x 0.7 cm with a heterogeneous appearance. Adjacent to this is a large structure measuring 2 cm diameter with some gas shadowing, possible hernia. Left: Grey scale: The left testicle is grossly abnormal in appearance and echotexture. It shows diffuse increased echogenicity with increased thickening of the central septations. Overall shape is slightly abnormal. There is blood flow seen only on 1 posteroinferior margin in the rest of the left testicle does not show any discernible blood flow, this could be a portion that is several days old. In addition there is diffuse swelling of the left scrotum and swelling of the left epididymis and surrounding tissues with several loculated abnormal fluid collections around the margins outside of the testicle. Considerations include hematomas versus abscesses.  Doppler Evaluation:  Left testicle is abnormal with severely diminished abnormal blood flow, possible chronic torsion. Scrotal Sac:  No simple hydrocele. Diffusely thickened scrotal walls. Severe posterior scrotal swelling. Scattered loculated abnormal fluid pockets. The largest of these is septated, measures approximately 2 cm x 1.2 cm x 1.2 cm with echogenic fluid in the center. Increased blood flow around the outside margins of this collection. Abscess versus hematoma most likely within the epididymis. Epididymis:  Very abnormal left epididymis. No normal left epididymal structures seen. Reference scrotal contents above. Very abnormal study with severe swelling on the left have mild swelling on the right. Abnormal left testicle, which could be a torsion of several days old. Very abnormal left epididymis, thickened, inflamed, with scattered complex fluid collections. Abscesses versus hematomas. Right testicle slightly hyperemic but without evidence of torsion. Right epididymis is slightly thickened and moderately hyperemic. Report called to [facility] emergency room and discussed directly with Dr. Masha Marks at 1340 hours 12/21/2020.       ASSESSMENT:  Epididymal abscess on left  Urinary tract infection  Hypertension  Insulin-dependent diabetes      PLAN:  Admit to medicine  Urology following  Ceftriaxone 1 g daily  Follow urine and blood cultures  Medium dose correction insulin with Lantus at night  Continue home doses of amlodipine lisinopril      Diet: No diet orders on file  Code Status: No Order  Surrogate decision maker confirmed with patient:   Extended Emergency Contact Information  Primary Emergency Contact: [address], [facility] Phone: [phone]  Relation: Other  Secondary Emergency Contact: Jaja Bennett  [place] Phone: [phone]  Relation: Other      DVT Prophylaxis: []Lovenox []Heparin []PCD [] [address] []Encouraged ambulation  Disposition: []Med/Surg [] Intermediate [] ICU/CCU  Admit status: [] Observation [] Inpatient     NOTE: This report was transcribed using voice recognition software. Every effort was made to ensure accuracy; however, inadvertent computerized transcription errors may be present.

## 2020-12-21 NOTE — PLAN OF CARE
Problem: Safety:  Goal: Free from accidental physical injury  Description: Free from accidental physical injury  Outcome: Met This Shift     Problem: Daily Care:  Goal: Daily care needs are met  Description: Daily care needs are met  Outcome: Met This Shift

## 2020-12-21 NOTE — ACP (ADVANCE CARE PLANNING)
ADVANCED CARE PLANNING    Patient Name: Ellyn Benitez       YOB: 1960              MRN:    [id number]  Admission Date:  12/21/2020 11:02 AM      Active Diagnoses: Active Problems:    Epididymal abscess  Resolved Problems:    * No resolved hospital problems. *      These active diagnoses are of sufficient risk that focused discussion on advanced care planning is indicated in order to allow the patient to thoughtfully consider personal goals of care; and, if situations arise that prevent the patient to personally give input, to ensure appropriate representation of their personal desire for different levels and levels of care. Person(s) present in discussion: Ellyn BenitezClaudio DO    Discussion: I reviewed his admission for the above diagnoses and his desires for ongoing aggresive care, including potential intubation and mechanical ventilation; also discussed who would speak on his behalf should he be unable to do so and discussed what conversations he has had with his family so they understand his desires if such a situation occurred now or in the future. I presented and explained the availability of our palliaitve care team to him, which he will review with his family this evening and then fill out. PLAN:  Code Status:  At this time patient wishes to be full code      Time Spent on Advanced Planning Documents: 18 minutes    [facility] [address] [place] [address]

## 2020-12-21 NOTE — ED PROVIDER NOTES
[facility]  Department of Emergency Medicine     Written by: Idalmis Hartman DO  Patient Name: Alcira Abraham  Attending Provider: Julian Alexander MD  Admit Date: 2020 11:02 AM  MRN: [id number]                   : 1960        Chief Complaint   Patient presents with    Groin Swelling     Started prior to Friday, went to [place] today and sent in for testiclar swelling and pain. States on Friday he went and bough a support strap with no relief. Swelling is only on the left with pain generalized. - Chief complaint    Patient is a 81-year-old male past medical history of hypertension and diabetes. Patient has a chief complaint of left-sided testicular swelling. Patient states that symptoms began approximately 1 week ago. Patient did go to [employer] on Friday and got a jockstrap which did provide some relief. He noted pain and swelling to his left testicle. He describes the pain as an aching sensation. He currently rates pain 10 out of 10. He does get some relief with elevation of the testicle. Patient states pain is worsened with palpation. Patient notes some nausea but denies any fevers, chills, cough, chest pain, abdominal pain, constipation or diarrhea. .    The history is provided by the patient. No  was used. Review of Systems   Constitutional: Negative for chills and fever. HENT: Negative for ear pain, sinus pressure and sore throat. Eyes: Negative for pain, discharge and redness. Respiratory: Negative for cough, shortness of breath and wheezing. Cardiovascular: Negative for chest pain. Gastrointestinal: Negative for abdominal pain, diarrhea, nausea and vomiting. Genitourinary: Positive for scrotal swelling and testicular pain. Negative for discharge, dysuria, frequency and penile pain. Musculoskeletal: Negative for arthralgias and back pain. Skin: Negative for rash and wound.    Neurological: Negative for weakness and headaches. Hematological: Negative for adenopathy. All other systems reviewed and are negative. Physical Exam  Vitals signs and nursing note reviewed. Constitutional:       Appearance: He is well-developed. HENT:      Head: Normocephalic and atraumatic. Eyes:      Conjunctiva/sclera: Conjunctivae normal.   Neck:      Musculoskeletal: Normal range of motion and neck supple. Cardiovascular:      Rate and Rhythm: Normal rate and regular rhythm. Heart sounds: Normal heart sounds. No murmur. Pulmonary:      Effort: Pulmonary effort is normal. No respiratory distress. Breath sounds: Normal breath sounds. No wheezing or rales. Abdominal:      General: Bowel sounds are normal.      Palpations: Abdomen is soft. Tenderness: There is no abdominal tenderness. There is no guarding or rebound. Genitourinary:     Penis: Uncircumcised. Testes: Cremasteric reflex is present. Left: Tenderness and swelling present. Epididymis:      Left: Inflamed and enlarged. Comments: No crepitus noted to the perineum area, no open wounds, patient uncircumcised foreskin easy to retract no discharge noted. Musculoskeletal:         General: No tenderness or deformity. Skin:     General: Skin is warm and dry. Neurological:      Mental Status: He is alert and oriented to person, place, and time. Cranial Nerves: No cranial nerve deficit. Coordination: Coordination normal.          Procedures       MDM  Number of Diagnoses or Management Options  Epididymal abscess  Diagnosis management comments: Patient is a 23-year-old male past medical history of hypertension and diabetes. Patient presents with chief complaint of scrotal pain. Patient states that symptoms were approximately 2. Vital signs stable on presentation. Physical exam heart regular rate and rhythm, lungs clear to auscultation bilaterally. Abdomen soft nontender.   On general exam patient uncircumcised, massive swelling and tenderness to the left scrotum, no wounds noted, no crepitus noted under the skin there is warmth with mild erythema. CBC was obtained is unremarkable, CMP obtained marker for elevated blood glucose of 130, urinalysis obtained did demonstrate signs of infection. Ultrasound of the scrotum obtained demonstrated multiple fluid and air-filled pockets in his scrotal sac concerning for possible epididymal abscess. Patient given 1 g ceftriaxone as well as IV Toradol for pain control. .  Call placed to urology who evaluated patient and agreed with concern for possible epidural abscess. Decision made to admit patient, case discussed with hospitalist agreed to meet patient. Plan of care discussed the patient include admission, all questions answered patient is agreement plan of care was admitted to the hospital in stable condition. Amount and/or Complexity of Data Reviewed  Clinical lab tests: ordered and reviewed  Tests in the radiology section of CPT®: ordered and reviewed    Risk of Complications, Morbidity, and/or Mortality  Presenting problems: moderate  Diagnostic procedures: moderate  Management options: moderate    Patient Progress  Patient progress: stable     --------------------------------------------- PAST HISTORY ---------------------------------------------  Past Medical History:  has a past medical history of Diabetes mellitus ([facility] [address] Past Surgical History:  has no past surgical history on file. Social History:  reports that he has been smoking. He has never used smokeless tobacco.    Family History: family history is not on file. The patients home medications have been reviewed. Allergies: Patient has no known allergies.     -------------------------------------------------- RESULTS -------------------------------------------------    LABS:  Results for orders placed or performed during the hospital encounter of 12/21/20   C.trachomatis N.gonorrhoeae DNA, Urine Specimen: Urine   Result Value Ref Range    Source Urine    CBC Auto Differential   Result Value Ref Range    WBC 9.3 4.5 - 11.5 E9/L    RBC 4.47 3.80 - 5.80 E12/L    Hemoglobin 13.7 12.5 - 16.5 g/dL    Hematocrit 40.1 37.0 - 54.0 %    MCV 89.7 80.0 - 99.9 fL    MCH 30.6 26.0 - 35.0 pg    MCHC 34.2 32.0 - 34.5 %    RDW 13.3 11.5 - 15.0 fL    Platelets 379 975 - 344 E9/L    MPV 10.2 7.0 - 12.0 fL    Neutrophils % 69.7 43.0 - 80.0 %    Immature Granulocytes % 0.3 0.0 - 5.0 %    Lymphocytes % 22.3 20.0 - 42.0 %    Monocytes % 7.4 2.0 - 12.0 %    Eosinophils % 0.1 0.0 - 6.0 %    Basophils % 0.2 0.0 - 2.0 %    Neutrophils Absolute 6.45 1.80 - 7.30 E9/L    Immature Granulocytes # 0.03 E9/L    Lymphocytes Absolute 2.06 1.50 - 4.00 E9/L    Monocytes Absolute 0.68 0.10 - 0.95 E9/L    Eosinophils Absolute 0.01 (L) 0.05 - 0.50 E9/L    Basophils Absolute 0.02 0.00 - 0.20 E9/L   Comprehensive Metabolic Panel w/ Reflex to MG   Result Value Ref Range    Sodium 138 132 - 146 mmol/L    Potassium reflex Magnesium 4.3 3.5 - 5.0 mmol/L    Chloride 103 98 - 107 mmol/L    CO2 23 22 - 29 mmol/L    Anion Gap 12 7 - 16 mmol/L    Glucose 130 (H) 74 - 99 mg/dL    BUN 11 6 - 20 mg/dL    CREATININE 0.9 0.7 - 1.2 mg/dL    GFR Non-African American >60 >=60 mL/min/1.73    GFR African American >60     Calcium 9.4 8.6 - 10.2 mg/dL    Total Protein 7.5 6.4 - 8.3 g/dL    Alb 3.5 3.5 - 5.2 g/dL    Total Bilirubin 0.8 0.0 - 1.2 mg/dL    Alkaline Phosphatase 71 40 - 129 U/L    ALT 13 0 - 40 U/L    AST 26 0 - 39 U/L   Urinalysis, reflex to microscopic   Result Value Ref Range    Color, UA Yellow Straw/Yellow    Clarity, UA CLOUDY (A) Clear    Glucose, Ur Negative Negative mg/dL    Bilirubin Urine Negative Negative    Ketones, Urine TRACE (A) Negative mg/dL    Specific Gravity, UA >=1.030 1.005 - 1.030    Blood, Urine TRACE-INTACT Negative    pH, UA 6.0 5.0 - 9.0    Protein,  (A) Negative mg/dL    Urobilinogen, Urine 2.0 (A) <2.0 E.U./dL    Nitrite, Urine POSITIVE (A) Negative    Leukocyte Esterase, Urine SMALL (A) Negative   Microscopic Urinalysis   Result Value Ref Range    WBC, UA 10-20 (A) 0 - 5 /HPF    RBC, UA 1-3 0 - 2 /HPF    Epithelial Cells, UA FEW /HPF    Bacteria, UA MANY (A) None Seen /HPF       RADIOLOGY:  US DUP ABD PEL RETRO SCROT COMPLETE   Final Result   Very abnormal study with severe swelling on the left have mild swelling on   the right. Abnormal left testicle, which could be a torsion of several days   old. Very abnormal left epididymis, thickened, inflamed, with scattered   complex fluid collections. Abscesses versus hematomas. Right testicle slightly hyperemic but without evidence of torsion. Right epididymis is slightly thickened and moderately hyperemic. Report called to [facility] emergency room and discussed directly with   Dr. Anyi Chong at 1340 hours 12/21/2020. US SCROTUM AND TESTICLES   Final Result   Very abnormal study with severe swelling on the left have mild swelling on   the right. Abnormal left testicle, which could be a torsion of several days   old. Very abnormal left epididymis, thickened, inflamed, with scattered   complex fluid collections. Abscesses versus hematomas. Right testicle slightly hyperemic but without evidence of torsion. Right epididymis is slightly thickened and moderately hyperemic. Report called to [facility] emergency room and discussed directly with   Dr. Anyi Chong at 1340 hours 12/21/2020.        ------------------------- NURSING NOTES AND VITALS REVIEWED ---------------------------  Date / Time Roomed:  12/21/2020 11:02 AM  ED Bed Assignment:  37/37    The nursing notes within the ED encounter and vital signs as below have been reviewed.      Patient Vitals for the past 24 hrs:   BP Temp Pulse Resp SpO2 Height Weight   12/21/20 1105 (!) 196/106 98.3 °F (36.8 °C) 68 16 98 % 5' 8\" (1.727 m) 165 lb (74.8 kg)       Oxygen Saturation Interpretation:

## 2020-12-21 NOTE — CONSULTS
12/21/2020 3:30 PM  Service: Urology  Group: [facility] ([facility]Julienne[facility])    Hector Melendez  [id number]     Chief Complaint:    Left testicular swelling and pain     History of Present Illness: The patient is a 62 y.o. male patient who presented to the emergency department today with complaints of left-sided testicular pain and swelling that began approximately 4 days ago. He is visiting from [place] and states that he has a history of BPH. He takes Flomax and finasteride but states that he has been out of his Flomax for \"quite some time. \" He denies any dysuria, fever, or chills. He is diabetic. Urinalysis is remarkable for UTI. Past Medical History:   Diagnosis Date    [facility]          History reviewed. No pertinent surgical history. Medications Prior to Admission:    Not in a hospital admission. Allergies:    Patient has no known allergies. Social History:    reports that he has been smoking. He has never used smokeless tobacco.    Family History:   Non-contributory to this Urological problem  family history is not on file. Review of Systems:  Constitutional: No fever or chills   Respiratory: negative for cough and hemoptysis  Cardiovascular: negative for chest pain and dyspnea  Gastrointestinal: negative for abdominal pain, diarrhea, nausea and vomiting   Derm: negative for rash and skin lesion(s)  Neurological: negative for seizures and tremors  Musculoskeletal: Negative    Psychiatric: Negative   : As above in the HPI, otherwise negative  All other reviews are negative    Physical Exam:     Vitals:  BP (!) 196/106   Pulse 68   Temp 98.3 °F (36.8 °C)   Resp 16   Ht 5' 8\" (1.727 m)   Wt 165 lb (74.8 kg)   SpO2 98%   BMI 25.09 kg/m²     General:  Awake, alert, oriented X 3. No apparent distress. HEENT:  Normocephalic, atraumatic. Lungs:  Respirations symmetric and non-labored.   Abdomen:  soft, nontender, no masses  Extremities:  No clubbing, cyanosis, or edema  Skin:  Warm and dry, no open lesions or rashes  Neuro: There are no motor or sensory deficits in the 4 quadrant extremities   Rectal: deferred  Genitourinary:      Labs:     Recent Labs     12/21/20  1137   WBC 9.3   RBC 4.47   HGB 13.7   HCT 40.1   MCV 89.7   MCH 30.6   MCHC 34.2   RDW 13.3      MPV 10.2         Recent Labs     12/21/20  1137   CREATININE 0.9       Imaging:   Narrative   EXAMINATION:   ULTRASOUND OF THE SCROTUM/TESTICLES WITH COLOR DOPPLER FLOW EVALUATION;   DOPPLER EVALUATION OF THE PELVIS   12/21/2020   COMPARISON:   None. HISTORY:   ORDERING SYSTEM PROVIDED HISTORY: swelling   TECHNOLOGIST PROVIDED HISTORY:   Reason for exam:->swelling   What reading provider will be dictating this exam?->CRC   FINDINGS:   Measurements:   Right testicle: The right testicle measures approximately 4.6 cm long by 2.0   cm AP by  2.9 cm transverse. Left testicle: The left testicle measures approximately 3.8 cm long by 2.0 cm   AP by 3.0 cm transverse. Right:   Grey scale:  The right testicle demonstrates normal homogeneous echotexture   without focal lesion.  No evidence of testicular microlithiasis. Doppler Evaluation:  There is normal arterial and venous Doppler flow within   the testicle.  Overall increased blood flow on the right side. Scrotal Sac:  No evidence of hydrocele. Epididymis:  The right epididymis is diffusely mildly thickened in the head,   body and tail with diffuse increased blood flow.  Dilated veins are also seen   in the spermatic cord.  The epididymal body measures approximately 9 mm   thick, normal is maximum 3 mm.  The right epididymal head was measured at 1.0   cm by 1.4 cm x 0.7 cm with a heterogeneous appearance.  Adjacent to this is a   large structure measuring 2 cm diameter with some gas shadowing, possible   hernia.    Left:   Grey scale:  The left testicle is grossly abnormal in appearance and   echotexture.  It shows diffuse increased echogenicity with increased   thickening of the central septations.  Overall shape is slightly abnormal.   There is blood flow seen only on 1 posteroinferior margin in the rest of the   left testicle does not show any discernible blood flow, this could be a   portion that is several days old. Priti Bevel addition there is diffuse swelling of   the left scrotum and swelling of the left epididymis and surrounding tissues   with several loculated abnormal fluid collections around the margins outside   of the testicle.  Considerations include hematomas versus abscesses. Doppler Evaluation:  Left testicle is abnormal with severely diminished   abnormal blood flow, possible chronic torsion. Scrotal Sac:  No simple hydrocele.  Diffusely thickened scrotal walls. Severe posterior scrotal swelling.  Scattered loculated abnormal fluid   pockets.  The largest of these is septated, measures approximately 2 cm x 1.2   cm x 1.2 cm with echogenic fluid in the center.  Increased blood flow around   the outside margins of this collection.  Abscess versus hematoma most likely   within the epididymis. Epididymis:  Very abnormal left epididymis.  No normal left epididymal   structures seen.  Reference scrotal contents above.       Impression   Very abnormal study with severe swelling on the left have mild swelling on   the right.  Abnormal left testicle, which could be a torsion of several days   old. Roberta Regino abnormal left epididymis, thickened, inflamed, with scattered   complex fluid collections.  Abscesses versus hematomas. Right testicle slightly hyperemic but without evidence of torsion. Right epididymis is slightly thickened and moderately hyperemic. Report called to [facility] emergency room and discussed directly with   Dr. Filipe Caceres at 1340 hours 12/21/2020.          Assessment/plan:  Left epididymoorchitis   UTI     UA reviewed  Urine culture pending  Blood cultures pending  Continue antibiotics per medical team, received Rocephin in the ED  Scrotal ultrasound reviewed, exam consistent with left epididymo-orchitis  No plans for OR at this time, will monitor on antibiotics  Bladder PVR ordered  Will start him back on his Flomax, he should be discharged on this  We will cont to follow                 Electronically signed by KIA De La Fuente CNP on 12/21/2020 at 3:30 PM     Pt seen and examined  Pt has epididymorchitis do not suspect torsion or abscess

## 2020-12-22 LAB
ALBUMIN SERPL-MCNC: 3 G/DL (ref 3.5–5.2)
ALP BLD-CCNC: 59 U/L (ref 40–129)
ALT SERPL-CCNC: 9 U/L (ref 0–40)
ANION GAP SERPL CALCULATED.3IONS-SCNC: 9 MMOL/L (ref 7–16)
AST SERPL-CCNC: 15 U/L (ref 0–39)
BASOPHILS ABSOLUTE: 0.01 E9/L (ref 0–0.2)
BASOPHILS RELATIVE PERCENT: 0.2 % (ref 0–2)
BILIRUB SERPL-MCNC: 0.6 MG/DL (ref 0–1.2)
BUN BLDV-MCNC: 11 MG/DL (ref 6–20)
CALCIUM SERPL-MCNC: 8.6 MG/DL (ref 8.6–10.2)
CHLORIDE BLD-SCNC: 102 MMOL/L (ref 98–107)
CO2: 23 MMOL/L (ref 22–29)
CREAT SERPL-MCNC: 0.8 MG/DL (ref 0.7–1.2)
EOSINOPHILS ABSOLUTE: 0.02 E9/L (ref 0.05–0.5)
EOSINOPHILS RELATIVE PERCENT: 0.4 % (ref 0–6)
GFR AFRICAN AMERICAN: >60
GFR NON-AFRICAN AMERICAN: >60 ML/MIN/1.73
GLUCOSE BLD-MCNC: 147 MG/DL (ref 74–99)
HBA1C MFR BLD: 9.4 % (ref 4–5.6)
HCT VFR BLD CALC: 35.9 % (ref 37–54)
HEMOGLOBIN: 12.4 G/DL (ref 12.5–16.5)
IMMATURE GRANULOCYTES #: 0.04 E9/L
IMMATURE GRANULOCYTES %: 0.7 % (ref 0–5)
LYMPHOCYTES ABSOLUTE: 1.18 E9/L (ref 1.5–4)
LYMPHOCYTES RELATIVE PERCENT: 21.6 % (ref 20–42)
MCH RBC QN AUTO: 30.4 PG (ref 26–35)
MCHC RBC AUTO-ENTMCNC: 34.5 % (ref 32–34.5)
MCV RBC AUTO: 88 FL (ref 80–99.9)
METER GLUCOSE: 126 MG/DL (ref 74–99)
METER GLUCOSE: 133 MG/DL (ref 74–99)
METER GLUCOSE: 175 MG/DL (ref 74–99)
METER GLUCOSE: 285 MG/DL (ref 74–99)
MONOCYTES ABSOLUTE: 0.48 E9/L (ref 0.1–0.95)
MONOCYTES RELATIVE PERCENT: 8.8 % (ref 2–12)
NEUTROPHILS ABSOLUTE: 3.74 E9/L (ref 1.8–7.3)
NEUTROPHILS RELATIVE PERCENT: 68.3 % (ref 43–80)
PDW BLD-RTO: 13.2 FL (ref 11.5–15)
PLATELET # BLD: 272 E9/L (ref 130–450)
PMV BLD AUTO: 10.1 FL (ref 7–12)
POTASSIUM REFLEX MAGNESIUM: 3.6 MMOL/L (ref 3.5–5)
RBC # BLD: 4.08 E12/L (ref 3.8–5.8)
SODIUM BLD-SCNC: 134 MMOL/L (ref 132–146)
TOTAL PROTEIN: 6.5 G/DL (ref 6.4–8.3)
WBC # BLD: 5.5 E9/L (ref 4.5–11.5)

## 2020-12-22 PROCEDURE — 85025 COMPLETE CBC W/AUTO DIFF WBC: CPT

## 2020-12-22 PROCEDURE — 6370000000 HC RX 637 (ALT 250 FOR IP): Performed by: NURSE PRACTITIONER

## 2020-12-22 PROCEDURE — 6370000000 HC RX 637 (ALT 250 FOR IP): Performed by: FAMILY MEDICINE

## 2020-12-22 PROCEDURE — 96376 TX/PRO/DX INJ SAME DRUG ADON: CPT

## 2020-12-22 PROCEDURE — 36415 COLL VENOUS BLD VENIPUNCTURE: CPT

## 2020-12-22 PROCEDURE — 82962 GLUCOSE BLOOD TEST: CPT

## 2020-12-22 PROCEDURE — 83036 HEMOGLOBIN GLYCOSYLATED A1C: CPT

## 2020-12-22 PROCEDURE — 96372 THER/PROPH/DIAG INJ SC/IM: CPT

## 2020-12-22 PROCEDURE — 1200000000 HC SEMI PRIVATE

## 2020-12-22 PROCEDURE — 6360000002 HC RX W HCPCS: Performed by: FAMILY MEDICINE

## 2020-12-22 PROCEDURE — 80053 COMPREHEN METABOLIC PANEL: CPT

## 2020-12-22 PROCEDURE — 2580000003 HC RX 258: Performed by: FAMILY MEDICINE

## 2020-12-22 RX ORDER — FINASTERIDE 5 MG/1
5 TABLET, FILM COATED ORAL DAILY
Status: DISCONTINUED | OUTPATIENT
Start: 2020-12-22 | End: 2020-12-23 | Stop reason: HOSPADM

## 2020-12-22 RX ADMIN — INSULIN LISPRO 6 UNITS: 100 INJECTION, SOLUTION INTRAVENOUS; SUBCUTANEOUS at 11:28

## 2020-12-22 RX ADMIN — FINASTERIDE 5 MG: 5 TABLET, FILM COATED ORAL at 12:22

## 2020-12-22 RX ADMIN — INSULIN GLARGINE 19 UNITS: 100 INJECTION, SOLUTION SUBCUTANEOUS at 20:12

## 2020-12-22 RX ADMIN — HYDROCODONE BITARTRATE AND ACETAMINOPHEN 1 TABLET: 5; 325 TABLET ORAL at 17:32

## 2020-12-22 RX ADMIN — HYDROCODONE BITARTRATE AND ACETAMINOPHEN 1 TABLET: 5; 325 TABLET ORAL at 06:10

## 2020-12-22 RX ADMIN — SODIUM CHLORIDE, PRESERVATIVE FREE 10 ML: 5 INJECTION INTRAVENOUS at 08:19

## 2020-12-22 RX ADMIN — HYDROCODONE BITARTRATE AND ACETAMINOPHEN 1 TABLET: 5; 325 TABLET ORAL at 11:27

## 2020-12-22 RX ADMIN — ENOXAPARIN SODIUM 40 MG: 40 INJECTION SUBCUTANEOUS at 08:15

## 2020-12-22 RX ADMIN — AMLODIPINE BESYLATE 10 MG: 10 TABLET ORAL at 08:16

## 2020-12-22 RX ADMIN — KETOROLAC TROMETHAMINE 15 MG: 30 INJECTION, SOLUTION INTRAMUSCULAR at 02:51

## 2020-12-22 RX ADMIN — INSULIN LISPRO 2 UNITS: 100 INJECTION, SOLUTION INTRAVENOUS; SUBCUTANEOUS at 16:39

## 2020-12-22 RX ADMIN — KETOROLAC TROMETHAMINE 15 MG: 30 INJECTION, SOLUTION INTRAMUSCULAR at 08:52

## 2020-12-22 RX ADMIN — HYDROCODONE BITARTRATE AND ACETAMINOPHEN 1 TABLET: 5; 325 TABLET ORAL at 22:09

## 2020-12-22 RX ADMIN — TAMSULOSIN HYDROCHLORIDE 0.4 MG: 0.4 CAPSULE ORAL at 08:16

## 2020-12-22 RX ADMIN — CEFTRIAXONE SODIUM 1 G: 1 INJECTION, POWDER, FOR SOLUTION INTRAMUSCULAR; INTRAVENOUS at 14:01

## 2020-12-22 RX ADMIN — KETOROLAC TROMETHAMINE 15 MG: 30 INJECTION, SOLUTION INTRAMUSCULAR at 15:20

## 2020-12-22 RX ADMIN — SODIUM CHLORIDE, PRESERVATIVE FREE 10 ML: 5 INJECTION INTRAVENOUS at 20:11

## 2020-12-22 RX ADMIN — LISINOPRIL 10 MG: 10 TABLET ORAL at 09:31

## 2020-12-22 ASSESSMENT — PAIN DESCRIPTION - ONSET
ONSET: ON-GOING
ONSET: ON-GOING

## 2020-12-22 ASSESSMENT — PAIN DESCRIPTION - LOCATION
LOCATION: STERNUM
LOCATION: SCROTUM

## 2020-12-22 ASSESSMENT — PAIN SCALES - GENERAL
PAINLEVEL_OUTOF10: 10
PAINLEVEL_OUTOF10: 8
PAINLEVEL_OUTOF10: 5
PAINLEVEL_OUTOF10: 7
PAINLEVEL_OUTOF10: 0
PAINLEVEL_OUTOF10: 5
PAINLEVEL_OUTOF10: 3
PAINLEVEL_OUTOF10: 7
PAINLEVEL_OUTOF10: 6
PAINLEVEL_OUTOF10: 5
PAINLEVEL_OUTOF10: 7
PAINLEVEL_OUTOF10: 8

## 2020-12-22 ASSESSMENT — PAIN DESCRIPTION - FREQUENCY
FREQUENCY: CONTINUOUS
FREQUENCY: CONTINUOUS

## 2020-12-22 ASSESSMENT — PAIN DESCRIPTION - ORIENTATION
ORIENTATION: RIGHT;LEFT
ORIENTATION: RIGHT;LEFT

## 2020-12-22 ASSESSMENT — PAIN DESCRIPTION - DESCRIPTORS
DESCRIPTORS: ACHING;CONSTANT
DESCRIPTORS: ACHING

## 2020-12-22 ASSESSMENT — PAIN DESCRIPTION - PAIN TYPE: TYPE: ACUTE PAIN

## 2020-12-22 NOTE — PROGRESS NOTES
Hospitalist Progress Note      SYNOPSIS: Patient admitted on 2020      Patient presented to the emergency department with complaint of left-sided testicular swelling. This began about 1 week ago. Pain is persistent and relieved with elevation. Denies fever, chills, nausea, vomiting, chest pain, shortness of breath, abdominal pain, diarrhea.      Vital signs assessed emergency department within normal limits and stable. Laboratory studies unremarkable. Urinalysis shows positive for infection. Ultrasound of the scrotum reveals possible epididymal abscess. Urology was consulted. An empiric antibiotics were initiated         SUBJECTIVE:    Patient seen and examined  Records reviewed. No acute events overnight    Temp (24hrs), Av.4 °F (36.9 °C), Min:98 °F (36.7 °C), Max:99.1 °F (37.3 °C)    DIET: DIET GENERAL;  CODE: Full Code    Intake/Output Summary (Last 24 hours) at 2020 1025  Last data filed at 2020 0940  Gross per 24 hour   Intake 240 ml   Output 200 ml   Net 40 ml       OBJECTIVE:    BP (!) 173/89   Pulse 62   Temp 98 °F (36.7 °C) (Oral)   Resp 18   Ht 5' 8\" (1.727 m)   Wt 165 lb (74.8 kg)   SpO2 100%   BMI 25.09 kg/m²     General appearance: No apparent distress, appears stated age and cooperative. HEENT:  Conjunctivae/corneas clear. Neck: Supple. No jugular venous distention. Respiratory: Clear to auscultation bilaterally, normal respiratory effort  Cardiovascular: Regular rate rhythm, normal S1-S2  Abdomen: Soft, nontender, nondistended  Musculoskeletal: No clubbing, cyanosis, no bilateral lower extremity edema. Brisk capillary refill.    Skin:  No rashes  on visible skin  Neurologic: awake, alert and following commands     ASSESSMENT:  Left epididymoorchitis  Urinary tract infection  Hypertension  Insulin-dependent diabetes       PLAN:  Urology following  Ceftriaxone 1 g daily  Follow blood and urine cultures  Medium dose correction insulin Lantus at night  Continue amlodipine and lisinopril        Medications:  REVIEWED DAILY    Infusion Medications    dextrose       Scheduled Medications    finasteride  5 mg Oral Daily    amLODIPine  10 mg Oral Daily    lisinopril  10 mg Oral Daily    sodium chloride flush  10 mL Intravenous 2 times per day    enoxaparin  40 mg Subcutaneous Daily    insulin glargine  0.25 Units/kg Subcutaneous Nightly    insulin lispro  0-12 Units Subcutaneous TID WC    insulin lispro  0-6 Units Subcutaneous Nightly    cefTRIAXone (ROCEPHIN) IV  1 g Intravenous Q24H    tamsulosin  0.4 mg Oral Daily     PRN Meds: sodium chloride flush, promethazine **OR** ondansetron, polyethylene glycol, acetaminophen **OR** acetaminophen, potassium chloride **OR** potassium alternative oral replacement **OR** potassium chloride, magnesium sulfate, glucose, dextrose, glucagon (rDNA), dextrose, ketorolac, HYDROcodone 5 mg - acetaminophen    Labs:     Recent Labs     12/21/20  1137 12/22/20  0631   WBC 9.3 5.5   HGB 13.7 12.4*   HCT 40.1 35.9*    272       Recent Labs     12/21/20  1137 12/22/20  0631    134   K 4.3 3.6    102   CO2 23 23   BUN 11 11   CREATININE 0.9 0.8   CALCIUM 9.4 8.6       Recent Labs     12/21/20  1137 12/22/20  0631   PROT 7.5 6.5   ALKPHOS 71 59   ALT 13 9   AST 26 15   BILITOT 0.8 0.6       No results for input(s): INR in the last 72 hours. No results for input(s): Asad [place] in the last 72 hours. Chronic labs:    Lab Results   Component Value Date    LABA1C 9.4 (H) 12/22/2020       Radiology: REVIEWED DAILY    +++++++++++++++++++++++++++++++++++++++++++++++++  [address], [place]  +++++++++++++++++++++++++++++++++++++++++++++++++  NOTE: This report was transcribed using voice recognition software. Every effort was made to ensure accuracy; however, inadvertent computerized transcription errors may be present.

## 2020-12-22 NOTE — PLAN OF CARE
Problem: Infection:  Goal: Will remain free from infection  Description: Will remain free from infection  12/22/2020 1118 by Junie Barrett RN  Outcome: Met This Shift  12/22/2020 0345 by Bong Saleh RN  Outcome: Met This Shift     Problem: Safety:  Goal: Free from accidental physical injury  Description: Free from accidental physical injury  12/22/2020 1118 by Junie Barrett RN  Outcome: Met This Shift  12/22/2020 0345 by Bong Saleh RN  Outcome: Met This Shift  Goal: Free from intentional harm  Description: Free from intentional harm  12/22/2020 1118 by Junie Barrett RN  Outcome: Met This Shift  12/22/2020 0345 by Bong Saleh RN  Outcome: Met This Shift     Problem: Daily Care:  Goal: Daily care needs are met  Description: Daily care needs are met  12/22/2020 1118 by Junie Barrett RN  Outcome: Met This Shift  12/22/2020 0345 by Bong Saleh RN  Outcome: Met This Shift     Problem: Pain:  Goal: Patient's pain/discomfort is manageable  Description: Patient's pain/discomfort is manageable  12/22/2020 1118 by Junie Barrett RN  Outcome: Met This Shift  12/22/2020 0345 by Bong Saleh RN  Outcome: Met This Shift  Goal: Pain level will decrease  Description: Pain level will decrease  12/22/2020 1118 by Junie Barrett RN  Outcome: Met This Shift  12/22/2020 0345 by Bong Saleh RN  Outcome: Met This Shift  Goal: Control of acute pain  Description: Control of acute pain  12/22/2020 1118 by Junie Barrett RN  Outcome: Met This Shift  12/22/2020 0345 by Bong Saleh RN  Outcome: Met This Shift  Goal: Control of chronic pain  Description: Control of chronic pain  12/22/2020 1118 by Junie Barrett RN  Outcome: Met This Shift  12/22/2020 0345 by Bong Saleh RN  Outcome: Met This Shift     Problem: Skin Integrity:  Goal: Skin integrity will stabilize  Description: Skin integrity will stabilize  12/22/2020 1118 by Junie Barrett RN  Outcome: Met This Shift  12/22/2020 0345 by Lucas Conde RN  Outcome: Met This Shift     Problem: Discharge Planning:  Goal: Patients continuum of care needs are met  Description: Patients continuum of care needs are met  12/22/2020 1118 by Romina Grider RN  Outcome: Met This Shift  12/22/2020 0345 by Lucas Conde RN  Outcome: Met This Shift     Problem: Falls - Risk of:  Goal: Will remain free from falls  Description: Will remain free from falls  12/22/2020 1118 by Romina Grider RN  Outcome: Met This Shift  12/22/2020 0345 by Lucas Conde RN  Outcome: Met This Shift  Goal: Absence of physical injury  Description: Absence of physical injury  12/22/2020 1118 by Romina Grider RN  Outcome: Met This Shift  12/22/2020 0345 by Lucas Conde RN  Outcome: Met This Shift

## 2020-12-22 NOTE — PROGRESS NOTES
12/22/2020 9:57 AM  Service: Urology  Group: [facility] (Farhat/Julienne/Pj)    Nicho Zhou  [id number]    Subjective:    Feeling much better today  Pain much improved  No fever or chills  No difficulty urinating     Review of Systems  Constitutional: No fever or chills   Respiratory: negative for cough and hemoptysis  Cardiovascular: negative for chest pain and dyspnea  Gastrointestinal: negative for abdominal pain, diarrhea, nausea and vomiting   : See above  Derm: negative for rash and skin lesion(s)  Neurological: negative for seizures and tremors  Musculoskeletal: Negative    Psychiatric: Negative   All other reviews are negative      Scheduled Meds:   amLODIPine  10 mg Oral Daily    lisinopril  10 mg Oral Daily    sodium chloride flush  10 mL Intravenous 2 times per day    enoxaparin  40 mg Subcutaneous Daily    insulin glargine  0.25 Units/kg Subcutaneous Nightly    insulin lispro  0-12 Units Subcutaneous TID WC    insulin lispro  0-6 Units Subcutaneous Nightly    cefTRIAXone (ROCEPHIN) IV  1 g Intravenous Q24H    tamsulosin  0.4 mg Oral Daily       Objective:  Vitals:    12/22/20 0800   BP: (!) 173/89   Pulse: 62   Resp: 18   Temp: 98 °F (36.7 °C)   SpO2: 100%         Allergies: Penicillins    General Appearance: alert and oriented to person, place and time and in no acute distress  Skin: no rash or erythema  Head: normocephalic and atraumatic  Pulmonary/Chest: normal air movement, no respiratory distress  Abdomen: soft, non-tender, non-distended  Genitourinary: left epididymoorchitis, no crepitus or evidence of abscess, no torsion   Extremities: no cyanosis, clubbing or edema         Labs:     Recent Labs     12/22/20  0631      K 3.6      CO2 23   BUN 11   CREATININE 0.8   GLUCOSE 147*   CALCIUM 8.6       Lab Results   Component Value Date    HGB 12.4 12/22/2020    HCT 35.9 12/22/2020         Assessment/Plan:  Left epididymoorchitis   UTI        Bladder PVR ordered  Will start him back on his Flomax, he should be discharged on this  We will cont to follow         Urine Cx pending  Blood Cx pending  Cont antibiotics per primary, on Rocephin   PVRs have been low  Ice and elevate scrotum   Jock strap for scrotal support   Cont without grimm   Cont the Flomax, he is normally on this but has been out of this for a while   Cont the [facility] for discharge from  standpoint  No further acute  interventions planned at this time        Tanya Todd, APRN - CNP   [facility]

## 2020-12-23 VITALS
BODY MASS INDEX: 25.01 KG/M2 | HEART RATE: 69 BPM | SYSTOLIC BLOOD PRESSURE: 176 MMHG | WEIGHT: 165 LBS | RESPIRATION RATE: 16 BRPM | HEIGHT: 68 IN | TEMPERATURE: 98.8 F | OXYGEN SATURATION: 99 % | DIASTOLIC BLOOD PRESSURE: 90 MMHG

## 2020-12-23 LAB
METER GLUCOSE: 116 MG/DL (ref 74–99)
METER GLUCOSE: 170 MG/DL (ref 74–99)
METER GLUCOSE: 195 MG/DL (ref 74–99)
ORGANISM: ABNORMAL
URINE CULTURE, ROUTINE: ABNORMAL

## 2020-12-23 PROCEDURE — 6370000000 HC RX 637 (ALT 250 FOR IP): Performed by: FAMILY MEDICINE

## 2020-12-23 PROCEDURE — 82962 GLUCOSE BLOOD TEST: CPT

## 2020-12-23 PROCEDURE — 6360000002 HC RX W HCPCS: Performed by: FAMILY MEDICINE

## 2020-12-23 PROCEDURE — 6370000000 HC RX 637 (ALT 250 FOR IP): Performed by: NURSE PRACTITIONER

## 2020-12-23 RX ORDER — LEVOFLOXACIN 500 MG/1
500 TABLET, FILM COATED ORAL DAILY
Status: DISCONTINUED | OUTPATIENT
Start: 2020-12-23 | End: 2020-12-23 | Stop reason: HOSPADM

## 2020-12-23 RX ORDER — FINASTERIDE 5 MG/1
5 TABLET, FILM COATED ORAL DAILY
Qty: 30 TABLET | Refills: 0 | Status: SHIPPED | OUTPATIENT
Start: 2020-12-23

## 2020-12-23 RX ORDER — TAMSULOSIN HYDROCHLORIDE 0.4 MG/1
0.4 CAPSULE ORAL DAILY
Qty: 30 CAPSULE | Refills: 0 | Status: SHIPPED | OUTPATIENT
Start: 2020-12-23

## 2020-12-23 RX ORDER — HYDROCODONE BITARTRATE AND ACETAMINOPHEN 5; 325 MG/1; MG/1
1 TABLET ORAL EVERY 6 HOURS PRN
Qty: 20 TABLET | Refills: 0 | Status: SHIPPED | OUTPATIENT
Start: 2020-12-23 | End: 2020-12-28

## 2020-12-23 RX ORDER — LEVOFLOXACIN 500 MG/1
500 TABLET, FILM COATED ORAL DAILY
Qty: 10 TABLET | Refills: 0 | Status: SHIPPED | OUTPATIENT
Start: 2020-12-23 | End: 2021-01-02

## 2020-12-23 RX ADMIN — AMLODIPINE BESYLATE 10 MG: 10 TABLET ORAL at 08:07

## 2020-12-23 RX ADMIN — HYDROCODONE BITARTRATE AND ACETAMINOPHEN 1 TABLET: 5; 325 TABLET ORAL at 07:01

## 2020-12-23 RX ADMIN — HYDROCODONE BITARTRATE AND ACETAMINOPHEN 1 TABLET: 5; 325 TABLET ORAL at 15:26

## 2020-12-23 RX ADMIN — HYDROCODONE BITARTRATE AND ACETAMINOPHEN 1 TABLET: 5; 325 TABLET ORAL at 02:37

## 2020-12-23 RX ADMIN — HYDROCODONE BITARTRATE AND ACETAMINOPHEN 1 TABLET: 5; 325 TABLET ORAL at 11:31

## 2020-12-23 RX ADMIN — TAMSULOSIN HYDROCHLORIDE 0.4 MG: 0.4 CAPSULE ORAL at 08:07

## 2020-12-23 RX ADMIN — FINASTERIDE 5 MG: 5 TABLET, FILM COATED ORAL at 08:07

## 2020-12-23 RX ADMIN — LISINOPRIL 10 MG: 10 TABLET ORAL at 08:18

## 2020-12-23 RX ADMIN — INSULIN LISPRO 2 UNITS: 100 INJECTION, SOLUTION INTRAVENOUS; SUBCUTANEOUS at 08:07

## 2020-12-23 RX ADMIN — ENOXAPARIN SODIUM 40 MG: 40 INJECTION SUBCUTANEOUS at 08:07

## 2020-12-23 ASSESSMENT — PAIN DESCRIPTION - LOCATION: LOCATION: SCROTUM

## 2020-12-23 ASSESSMENT — PAIN - FUNCTIONAL ASSESSMENT: PAIN_FUNCTIONAL_ASSESSMENT: PREVENTS OR INTERFERES SOME ACTIVE ACTIVITIES AND ADLS

## 2020-12-23 ASSESSMENT — PAIN SCALES - GENERAL
PAINLEVEL_OUTOF10: 6
PAINLEVEL_OUTOF10: 4
PAINLEVEL_OUTOF10: 7
PAINLEVEL_OUTOF10: 6

## 2020-12-23 ASSESSMENT — PAIN DESCRIPTION - DESCRIPTORS: DESCRIPTORS: ACHING

## 2020-12-23 ASSESSMENT — PAIN DESCRIPTION - PAIN TYPE: TYPE: ACUTE PAIN

## 2020-12-23 ASSESSMENT — PAIN DESCRIPTION - ORIENTATION: ORIENTATION: RIGHT;LEFT

## 2020-12-23 ASSESSMENT — PAIN DESCRIPTION - FREQUENCY: FREQUENCY: CONTINUOUS

## 2020-12-23 ASSESSMENT — PAIN DESCRIPTION - ONSET: ONSET: ON-GOING

## 2020-12-23 ASSESSMENT — PAIN DESCRIPTION - PROGRESSION: CLINICAL_PROGRESSION: NOT CHANGED

## 2020-12-23 NOTE — PROGRESS NOTES
12/23/2020 10:01 AM  Service: Urology  Group: [facility] ([facility])    Ellyn Flow  [id number]    Subjective:    Still with some mild left sided testicular pain  No fevers  Voiding comfortably  PVRs low       Review of Systems  Constitutional: No fever or chills   Respiratory: negative for cough and hemoptysis  Cardiovascular: negative for chest pain and dyspnea  Gastrointestinal: negative for abdominal pain, diarrhea, nausea and vomiting   : See above  Derm: negative for rash and skin lesion(s)  Neurological: negative for seizures and tremors  Musculoskeletal: Negative    Psychiatric: Negative   All other reviews are negative      Scheduled Meds:   finasteride  5 mg Oral Daily    amLODIPine  10 mg Oral Daily    lisinopril  10 mg Oral Daily    sodium chloride flush  10 mL Intravenous 2 times per day    enoxaparin  40 mg Subcutaneous Daily    insulin glargine  0.25 Units/kg Subcutaneous Nightly    insulin lispro  0-12 Units Subcutaneous TID WC    insulin lispro  0-6 Units Subcutaneous Nightly    cefTRIAXone (ROCEPHIN) IV  1 g Intravenous Q24H    tamsulosin  0.4 mg Oral Daily       Objective:  Vitals:    12/23/20 0745   BP: (!) 176/90   Pulse: 69   Resp: 16   Temp: 98.8 °F (37.1 °C)   SpO2: 99%         Allergies: Penicillins    General Appearance: alert and oriented to person, place and time and in no acute distress  Skin: no rash or erythema  Head: normocephalic and atraumatic  Pulmonary/Chest: normal air movement, no respiratory distress  Abdomen: soft, non-tender, non-distended  Genitourinary: left epididymoorchitis, no crepitus or evidence of abscess, no torsion   Extremities: no cyanosis, clubbing or edema         Labs:     Recent Labs     12/22/20  0631      K 3.6      CO2 23   BUN 11   CREATININE 0.8   GLUCOSE 147*   CALCIUM 8.6       Lab Results   Component Value Date    HGB 12.4 12/22/2020    HCT 35.9 12/22/2020 12/23/2020  7:36 AM - Rusty, Mhy Incoming Results From [facility]    Specimen Information: Urine, clean catch        Component Collected Lab   Organism Abnormal  12/21/2020 11:37 AM  - [facility]   Escherichia coli    Urine Culture, Routine 12/21/2020 11:37 AM  - [facility]   >100,000 CFU/ml    Testing Performed By    Lab - [address] Name Director Address Valid Date Range   49- - [address] [facility] Musa Durbin  [facility]. Sara Norman [address] 12/03/19 1502-Present   Narrative  Performed by: [facility]  Source: URINE       Site:              Susceptibility    Escherichia coli (1)    Antibiotic Interpretation SIOBHAN Status    ampicillin Sensitive ^4 mcg/mL     ceFAZolin Sensitive <=^4 mcg/mL     cefepime Sensitive <=^0.12 mcg/mL     cefTRIAXone Sensitive <=^0.25 mcg/mL     Confirmatory Extended Spectrum Beta-Lactamase  Neg mcg/mL     ertapenem Sensitive <=^0.12 mcg/mL     gentamicin Sensitive <=^1 mcg/mL     levofloxacin Sensitive <=^0.12 mcg/mL     nitrofurantoin Sensitive <=^16 mcg/mL     piperacillin-tazobactam Sensitive <=^4 mcg/mL     trimethoprim-sulfamethoxazole Sensitive <=^20 mcg/mL           Assessment/Plan:  Left epididymoorchitis   E. Coli UTI        Urine Cx + E. Coli  Blood Cx unremarkable   Cont antibiotics per primary  PVRs have been low  Voiding well   Cont the Flomax and Proscar  Ice and elevate scrotum  Scrotal support with jock strap  NSAIDS prn pain   Home antibiotics per primary   No further acute  interventions planned at this time   Stevie Coe for discharge from [address] [place] [address], APRN - [facility]  Urology      I agree with the nurse practitioner assessment and plan. I personally evaluated the patient and made any changes to reflect my impression and plan.      Tiana Koroma MD

## 2020-12-23 NOTE — CARE COORDINATION
Per urology note today, Urine Cx + E. Coli, Blood Cx unremarkable. .. PVRs have been low. Voiding well. Miquel Freireke Ice and elevate scrotum. Scrotal support with jock strap. Miquel Scherer Home antibiotics per primary. No further acute  interventions planned at this time. [id number] [address] Wen for discharge from  standpoint. Patient lives in [place] and he drove up here to visit with family. He said that he has [facility] and he went to the [facility] on [address] since his [facility] will not cover anything here in [facility]. The [facility] sent him to the hospital. Patient's plan is to go on a bus back to [place] a few days after [place]. If antibiotics are needed at discharge he hopes to discharge on oral. If IV antibiotics needed he understands he will need to go through the [place] for that. Patient said that his family will transport him home at time of discharge.   Jeniffer Hudson RN CM

## 2020-12-23 NOTE — CONSULTS
Department of Podiatry   Consult Note        Reason for Consult:  Onychomycosis / Diabetic Foot Examiantion     CHIEF COMPLAINT:  Onychomycosis / Diabetic Foot Exam     HISTORY OF PRESENT ILLNESS:    62year old diabetic male admitted to the hospital with epididymal cyst, and podiatry consulted for high risk foot care / nails. Patient states that he is a diabetic and cannot trim his nails on his own, and would like them cut while he is admitted. Denies any pain to his feet. Complains of mild nausea, but no vomiting. No fevers or chills. Medical History     Past Medical History:   Diagnosis Date    Diabetes mellitus ([facility] [address]        Surgical History:  History reviewed. No pertinent surgical history. Family History:  History reviewed. No pertinent family history. Allergies: Allergies   Allergen Reactions    Penicillins Nausea And Vomiting       I reviewed the patient's past medical and surgical history, Medications and Allergies. REVIEW OF SYSTEMS:    All pertinent positive and negative ROS as previously stated in HPI. PHYSICAL EXAM:       Vitals:    12/21/20 2340 12/22/20 0800 12/22/20 2319 12/23/20 0745   BP: (!) 160/90 (!) 173/89 (!) 162/93 (!) 176/90   Pulse: 77 62 74 69   Resp: 18 18 16 16   Temp: 98.5 °F (36.9 °C) 98 °F (36.7 °C) 98.2 °F (36.8 °C) 98.8 °F (37.1 °C)   TempSrc: Oral Oral Oral Oral   SpO2: 99% 100% 100% 99%   Weight:       Height:              SKIN:  Nails were elongated, dystrophic, thickened, and discolored x 10 bilaterally. Skin is intact and well hydrated to the bilateral lower extremities. No rashes lesions or ulcers. No interdigit maceration. No erythema noted. NAILS:  Nails 1-5 on the right and left are thickened, elongated and discolored dystrophic incurvated and painful with palpation  NEUROLOGIC:  Protective sensation is diminished but grossly intact  VASCULAR:  DP and PT pulses are palpable. CFT less than 3 seconds.  Warm to warm from the tibial tuberosity to the distal aspect of the digits dorsally. MUSCULOSKELETAL: 5/5 muscle strength to all 4 quadrants of bilateral LE.     ASSESSMENT:  Tinea unguium  Pain in right toes and left toes  DM type 2 with neuropathy  PVD        PLAN:  -Initial examination and evaluation at bedside.   -Reviewed ancillary service notes and pertinent diagnostics  -Nails 1-5 on the right and left debrided with a pair of sterile nail nippers without incident and to the patients satisfaction. Nails debrided in both length and thickness to relieve pain and decrease risk of infection and or ulceration  -Diabetic foot exam.   -OK to d/c from Podiatry service. Reconsult if any other podiatric issues    DC with attending Dr. Kirsty Billingsley.

## 2020-12-23 NOTE — PLAN OF CARE
Problem: Infection:  Goal: Will remain free from infection  Description: Will remain free from infection  12/23/2020 1107 by Génesis Oliveira RN  Outcome: Met This Shift  12/23/2020 0350 by Demar Engel RN  Outcome: Met This Shift     Problem: Safety:  Goal: Free from accidental physical injury  Description: Free from accidental physical injury  12/23/2020 1107 by Génesis Oliveira RN  Outcome: Met This Shift  12/23/2020 0350 by Demar Engel RN  Outcome: Met This Shift  Goal: Free from intentional harm  Description: Free from intentional harm  12/23/2020 1107 by Génesis Oliveira RN  Outcome: Met This Shift  12/23/2020 0350 by Demar Engel RN  Outcome: Met This Shift     Problem: Daily Care:  Goal: Daily care needs are met  Description: Daily care needs are met  12/23/2020 1107 by Génesis Oliveira RN  Outcome: Met This Shift  12/23/2020 0350 by Demar Engel RN  Outcome: Met This Shift     Problem: Pain:  Goal: Patient's pain/discomfort is manageable  Description: Patient's pain/discomfort is manageable  12/23/2020 1107 by Génesis Oliveira RN  Outcome: Met This Shift  12/23/2020 0350 by Demar Engel RN  Outcome: Met This Shift  Goal: Pain level will decrease  Description: Pain level will decrease  12/23/2020 1107 by Génesis Oliveira RN  Outcome: Met This Shift  12/23/2020 0350 by Demar Engel RN  Outcome: Met This Shift  Goal: Control of acute pain  Description: Control of acute pain  12/23/2020 1107 by Génesis Oliveira RN  Outcome: Met This Shift  12/23/2020 0350 by Demar Engel RN  Outcome: Met This Shift  Goal: Control of chronic pain  Description: Control of chronic pain  12/23/2020 1107 by Génesis Oliveira RN  Outcome: Met This Shift  12/23/2020 0350 by Demar Engel RN  Outcome: Met This Shift     Problem: Skin Integrity:  Goal: Skin integrity will stabilize  Description: Skin integrity will stabilize  12/23/2020 1107 by Génesis Oliveira RN  Outcome: Met This Shift  12/23/2020 0350 by Saloni Awan RN  Outcome: Met This Shift     Problem: Discharge Planning:  Goal: Patients continuum of care needs are met  Description: Patients continuum of care needs are met  12/23/2020 0350 by Saloni Awan RN  Outcome: Met This Shift     Problem: Falls - Risk of:  Goal: Will remain free from falls  Description: Will remain free from falls  12/23/2020 1107 by Ulysses Mendoza RN  Outcome: Met This Shift  12/23/2020 0350 by Saloni Awan RN  Outcome: Met This Shift  Goal: Absence of physical injury  Description: Absence of physical injury  12/23/2020 1107 by Ulysses Mendoza RN  Outcome: Met This Shift  12/23/2020 0350 by Saloni Awan RN  Outcome: Met This Shift

## 2020-12-23 NOTE — PLAN OF CARE
Problem: Infection:  Goal: Will remain free from infection  Description: Will remain free from infection  12/23/2020 1756 by Hernan Demarco RN  Outcome: Completed  12/23/2020 1107 by Hernan Demarco RN  Outcome: Met This Shift     Problem: Safety:  Goal: Free from accidental physical injury  Description: Free from accidental physical injury  12/23/2020 1756 by Hernan Demarco RN  Outcome: Completed  12/23/2020 1107 by Hernan Demarco RN  Outcome: Met This Shift  Goal: Free from intentional harm  Description: Free from intentional harm  12/23/2020 1756 by Hernan Demarco RN  Outcome: Completed  12/23/2020 1107 by Hernan Demarco RN  Outcome: Met This Shift     Problem: Daily Care:  Goal: Daily care needs are met  Description: Daily care needs are met  12/23/2020 1756 by Hernan Demarco RN  Outcome: Completed  12/23/2020 1107 by Hernan Demarco RN  Outcome: Met This Shift     Problem: Pain:  Goal: Patient's pain/discomfort is manageable  Description: Patient's pain/discomfort is manageable  12/23/2020 1756 by Hernan Demarco RN  Outcome: Completed  12/23/2020 1107 by Hernan Demarco RN  Outcome: Met This Shift  Goal: Pain level will decrease  Description: Pain level will decrease  12/23/2020 1756 by Hernan Demarco RN  Outcome: Completed  12/23/2020 1107 by Hernan Demarco RN  Outcome: Met This Shift  Goal: Control of acute pain  Description: Control of acute pain  12/23/2020 1756 by Hernan Demarco RN  Outcome: Completed  12/23/2020 1107 by Hernan Deamrco RN  Outcome: Met This Shift  Goal: Control of chronic pain  Description: Control of chronic pain  12/23/2020 1756 by Hernan Demarco RN  Outcome: Completed  12/23/2020 1107 by Hernan Demarco RN  Outcome: Met This Shift     Problem: Skin Integrity:  Goal: Skin integrity will stabilize  Description: Skin integrity will stabilize  12/23/2020 1756 by Hernan Demarco RN  Outcome: Completed  12/23/2020 1107 by Hernan Demarco RN  Outcome: Met This Shift     Problem: Discharge Planning:  Goal: Patients continuum of care needs are met  Description: Patients continuum of care needs are met  Outcome: Completed     Problem: Falls - Risk of:  Goal: Will remain free from falls  Description: Will remain free from falls  12/23/2020 1756 by Kyle George RN  Outcome: Completed  12/23/2020 1107 by Kyle George RN  Outcome: Met This Shift  Goal: Absence of physical injury  Description: Absence of physical injury  12/23/2020 1756 by Kyle George RN  Outcome: Completed  12/23/2020 1107 by Kyle George RN  Outcome: Met This Shift

## 2020-12-23 NOTE — PROGRESS NOTES
CLINICAL PHARMACY NOTE: MEDS TO [address] Select Patient?: No  Total # of Prescriptions Filled: 1   The following medications were delivered to the patient:  · Flomax 0.4 mg     Total # of Interventions Completed: 2  Time Spent (min): 30    Additional Documentation:

## 2020-12-23 NOTE — PLAN OF CARE
Problem: Infection:  Goal: Will remain free from infection  Description: Will remain free from infection  Outcome: Met This Shift     Problem: Safety:  Goal: Free from accidental physical injury  Description: Free from accidental physical injury  Outcome: Met This Shift  Goal: Free from intentional harm  Description: Free from intentional harm  Outcome: Met This Shift     Problem: Daily Care:  Goal: Daily care needs are met  Description: Daily care needs are met  Outcome: Met This Shift     Problem: Pain:  Goal: Patient's pain/discomfort is manageable  Description: Patient's pain/discomfort is manageable  Outcome: Met This Shift  Goal: Pain level will decrease  Description: Pain level will decrease  Outcome: Met This Shift  Goal: Control of acute pain  Description: Control of acute pain  Outcome: Met This Shift  Goal: Control of chronic pain  Description: Control of chronic pain  Outcome: Met This Shift     Problem: Skin Integrity:  Goal: Skin integrity will stabilize  Description: Skin integrity will stabilize  Outcome: Met This Shift     Problem: Discharge Planning:  Goal: Patients continuum of care needs are met  Description: Patients continuum of care needs are met  Outcome: Met This Shift     Problem: Falls - Risk of:  Goal: Will remain free from falls  Description: Will remain free from falls  Outcome: Met This Shift  Goal: Absence of physical injury  Description: Absence of physical injury  Outcome: Met This Shift

## 2020-12-23 NOTE — DISCHARGE SUMMARY
epididymis and surrounding tissues with several loculated abnormal fluid collections around the margins outside of the testicle. Considerations include hematomas versus abscesses. Doppler Evaluation:  Left testicle is abnormal with severely diminished abnormal blood flow, possible chronic torsion. Scrotal Sac:  No simple hydrocele. Diffusely thickened scrotal walls. Severe posterior scrotal swelling. Scattered loculated abnormal fluid pockets. The largest of these is septated, measures approximately 2 cm x 1.2 cm x 1.2 cm with echogenic fluid in the center. Increased blood flow around the outside margins of this collection. Abscess versus hematoma most likely within the epididymis. Epididymis:  Very abnormal left epididymis. No normal left epididymal structures seen. Reference scrotal contents above. Very abnormal study with severe swelling on the left have mild swelling on the right. Abnormal left testicle, which could be a torsion of several days old. Very abnormal left epididymis, thickened, inflamed, with scattered complex fluid collections. Abscesses versus hematomas. Right testicle slightly hyperemic but without evidence of torsion. Right epididymis is slightly thickened and moderately hyperemic. Report called to [facility] emergency room and discussed directly with Dr. Rafael Rome at 1340 hours 12/21/2020. Us Dup Abd Pel Retro Scrot Complete    Result Date: 12/21/2020  EXAMINATION: ULTRASOUND OF THE SCROTUM/TESTICLES WITH COLOR DOPPLER FLOW EVALUATION; DOPPLER EVALUATION OF THE PELVIS 12/21/2020 COMPARISON: None. HISTORY: ORDERING SYSTEM PROVIDED HISTORY: swelling TECHNOLOGIST PROVIDED HISTORY: Reason for exam:->swelling What reading provider will be dictating this exam?->CRC FINDINGS: Measurements: Right testicle: The right testicle measures approximately 4.6 cm long by 2.0 cm AP by  2.9 cm transverse. Left testicle:  The left testicle measures approximately 3.8 cm long by 2.0 cm AP by 3.0 cm transverse. Right: Grey scale: The right testicle demonstrates normal homogeneous echotexture without focal lesion. No evidence of testicular microlithiasis. Doppler Evaluation:  There is normal arterial and venous Doppler flow within the testicle. Overall increased blood flow on the right side. Scrotal Sac:  No evidence of hydrocele. Epididymis: The right epididymis is diffusely mildly thickened in the head, body and tail with diffuse increased blood flow. Dilated veins are also seen in the spermatic cord. The epididymal body measures approximately 9 mm thick, normal is maximum 3 mm. The right epididymal head was measured at 1.0 cm by 1.4 cm x 0.7 cm with a heterogeneous appearance. Adjacent to this is a large structure measuring 2 cm diameter with some gas shadowing, possible hernia. Left: Grey scale: The left testicle is grossly abnormal in appearance and echotexture. It shows diffuse increased echogenicity with increased thickening of the central septations. Overall shape is slightly abnormal. There is blood flow seen only on 1 posteroinferior margin in the rest of the left testicle does not show any discernible blood flow, this could be a portion that is several days old. In addition there is diffuse swelling of the left scrotum and swelling of the left epididymis and surrounding tissues with several loculated abnormal fluid collections around the margins outside of the testicle. Considerations include hematomas versus abscesses. Doppler Evaluation:  Left testicle is abnormal with severely diminished abnormal blood flow, possible chronic torsion. Scrotal Sac:  No simple hydrocele. Diffusely thickened scrotal walls. Severe posterior scrotal swelling. Scattered loculated abnormal fluid pockets. The largest of these is septated, measures approximately 2 cm x 1.2 cm x 1.2 cm with echogenic fluid in the center. Increased blood flow around the outside margins of this collection.   Abscess versus hematoma most likely within the epididymis. Epididymis:  Very abnormal left epididymis. No normal left epididymal structures seen. Reference scrotal contents above. Very abnormal study with severe swelling on the left have mild swelling on the right. Abnormal left testicle, which could be a torsion of several days old. Very abnormal left epididymis, thickened, inflamed, with scattered complex fluid collections. Abscesses versus hematomas. Right testicle slightly hyperemic but without evidence of torsion. Right epididymis is slightly thickened and moderately hyperemic. Report called to [facility] emergency room and discussed directly with Dr. Jasmyn Ibarra at 1340 hours 12/21/2020. Discharge Medications:      Medication List      START taking these medications    finasteride 5 MG tablet  Commonly known as: Proscar  Take 1 tablet by mouth daily     HYDROcodone-acetaminophen 5-325 MG per tablet  Commonly known as: NORCO  Take 1 tablet by mouth every 6 hours as needed for Pain for up to 5 days.      tamsulosin 0.4 MG capsule  Commonly known as: FLOMAX  Take 1 capsule by mouth daily        CONTINUE taking these medications    amLODIPine 10 MG tablet  Commonly known as: NORVASC  Take 1 tablet by mouth daily     HumuLIN 70/30 KwikPen (70-30) 100 UNIT per ML injection pen  Generic drug: Insulin NPH Isophane & Regular  Inject 20 Units into the skin 2 times daily     lisinopril 10 MG tablet  Commonly known as: PRINIVIL;ZESTRIL  Take 1 tablet by mouth daily           Where to Get Your Medications      These medications were sent to [facility], [address] [address]    Phone: [phone]   · tamsulosin 0.4 MG capsule     These medications were sent to [facility] [address] [place] [address] [phone] Nas Parker [phone]  [address] [place] [id number]    Phone:

## 2020-12-26 LAB
BLOOD CULTURE, ROUTINE: NORMAL
CULTURE, BLOOD 2: NORMAL

## 2020-12-28 LAB
C. TRACHOMATIS DNA ,URINE: NEGATIVE
N. GONORRHOEAE DNA, URINE: NEGATIVE
SOURCE: NORMAL

## 2023-10-04 NOTE — ED NOTES
Provider currently in with patient.    [facility] Tawanna,  Shelton Bamberger Pt marked ready for ultrasound       Randi Mock RN  12/21/20 6199